# Patient Record
Sex: FEMALE | URBAN - METROPOLITAN AREA
[De-identification: names, ages, dates, MRNs, and addresses within clinical notes are randomized per-mention and may not be internally consistent; named-entity substitution may affect disease eponyms.]

---

## 2023-04-19 ENCOUNTER — APPOINTMENT (OUTPATIENT)
Dept: URBAN - METROPOLITAN AREA CLINIC 228 | Age: 78
Setting detail: DERMATOLOGY
End: 2023-04-19

## 2023-04-19 DIAGNOSIS — D49.2 NEOPLASM OF UNSPECIFIED BEHAVIOR OF BONE, SOFT TISSUE, AND SKIN: ICD-10-CM

## 2023-04-19 PROCEDURE — OTHER BIOPSY BY SHAVE METHOD: OTHER

## 2023-04-19 PROCEDURE — OTHER COUNSELING: OTHER

## 2023-04-19 PROCEDURE — OTHER MIPS QUALITY: OTHER

## 2023-04-19 PROCEDURE — 11102 TANGNTL BX SKIN SINGLE LES: CPT

## 2023-04-19 ASSESSMENT — LOCATION ZONE DERM: LOCATION ZONE: LEG

## 2023-04-19 ASSESSMENT — LOCATION SIMPLE DESCRIPTION DERM: LOCATION SIMPLE: LEFT PRETIBIAL REGION

## 2023-04-19 ASSESSMENT — LOCATION DETAILED DESCRIPTION DERM: LOCATION DETAILED: LEFT MEDIAL PROXIMAL PRETIBIAL REGION

## 2023-04-19 NOTE — PROCEDURE: MIPS QUALITY
Quality 111:Pneumonia Vaccination Status For Older Adults: Pneumococcal vaccine (PPSV23) administered on or after patient’s 60th birthday and before the end of the measurement period
Quality 130: Documentation Of Current Medications In The Medical Record: Current Medications Documented
Detail Level: Detailed
Quality 431: Preventive Care And Screening: Unhealthy Alcohol Use - Screening: Patient not identified as an unhealthy alcohol user when screened for unhealthy alcohol use using a systematic screening method
Quality 110: Preventive Care And Screening: Influenza Immunization: Influenza Immunization Administered during Influenza season
Quality 47: Advance Care Plan: Advance Care Planning discussed and documented; advance care plan or surrogate decision maker documented in the medical record.
Quality 226: Preventive Care And Screening: Tobacco Use: Screening And Cessation Intervention: Patient screened for tobacco use and is an ex/non-smoker

## 2023-04-27 ENCOUNTER — APPOINTMENT (OUTPATIENT)
Dept: URBAN - METROPOLITAN AREA CLINIC 228 | Age: 78
Setting detail: DERMATOLOGY
End: 2023-04-27

## 2023-04-27 DIAGNOSIS — S31000A OPEN WOUND(S) (MULTIPLE) OF UNSPECIFIED SITE(S), WITHOUT MENTION OF COMPLICATION: ICD-10-CM

## 2023-04-27 PROBLEM — S81.802A UNSPECIFIED OPEN WOUND, LEFT LOWER LEG, INITIAL ENCOUNTER: Status: ACTIVE | Noted: 2023-04-27

## 2023-04-27 PROCEDURE — OTHER MIPS QUALITY: OTHER

## 2023-04-27 PROCEDURE — OTHER COUNSELING: OTHER

## 2023-04-27 PROCEDURE — OTHER PATHOLOGY DISCUSSION: OTHER

## 2023-04-27 PROCEDURE — OTHER POST-OP WOUND CHECK: OTHER

## 2023-04-27 PROCEDURE — 99024 POSTOP FOLLOW-UP VISIT: CPT

## 2023-04-27 PROCEDURE — OTHER PRESCRIPTION: OTHER

## 2023-04-27 RX ORDER — MUPIROCIN 20 MG/G
OINTMENT TOPICAL
Qty: 22 | Refills: 0 | Status: ERX | COMMUNITY
Start: 2023-04-27

## 2023-04-27 ASSESSMENT — LOCATION ZONE DERM: LOCATION ZONE: LEG

## 2023-04-27 ASSESSMENT — LOCATION SIMPLE DESCRIPTION DERM: LOCATION SIMPLE: LEFT PRETIBIAL REGION

## 2023-04-27 ASSESSMENT — LOCATION DETAILED DESCRIPTION DERM: LOCATION DETAILED: LEFT PROXIMAL PRETIBIAL REGION

## 2023-04-27 NOTE — PROCEDURE: POST-OP WOUND CHECK
Detail Level: Detailed
Add 92350 Cpt? (Important Note: In 2017 The Use Of 58114 Is Being Tracked By Cms To Determine Future Global Period Reimbursement For Global Periods): yes
Wound Evaluated By: Demario Washington MD
Additional Comments: Mild pink erythema at the border

## 2023-05-01 ENCOUNTER — APPOINTMENT (OUTPATIENT)
Dept: URBAN - METROPOLITAN AREA CLINIC 228 | Age: 78
Setting detail: DERMATOLOGY
End: 2023-05-03

## 2023-05-01 DIAGNOSIS — Z85.828 PERSONAL HISTORY OF OTHER MALIGNANT NEOPLASM OF SKIN: ICD-10-CM

## 2023-05-01 PROBLEM — C44.729 SQUAMOUS CELL CARCINOMA OF SKIN OF LEFT LOWER LIMB, INCLUDING HIP: Status: ACTIVE | Noted: 2023-05-01

## 2023-05-01 PROCEDURE — OTHER MIPS QUALITY: OTHER

## 2023-05-01 PROCEDURE — 17313 MOHS 1 STAGE T/A/L: CPT

## 2023-05-01 PROCEDURE — 17314 MOHS ADDL STAGE T/A/L: CPT

## 2023-05-01 PROCEDURE — OTHER COUNSELING: OTHER

## 2023-05-01 PROCEDURE — OTHER PATHOLOGY DISCUSSION: OTHER

## 2023-05-01 PROCEDURE — OTHER MOHS SURGERY: OTHER

## 2023-05-01 NOTE — PROCEDURE: MIPS QUALITY
Quality 110: Preventive Care And Screening: Influenza Immunization: Influenza Immunization Administered during Influenza season
Quality 226: Preventive Care And Screening: Tobacco Use: Screening And Cessation Intervention: Patient screened for tobacco use and is an ex/non-smoker
Quality 111:Pneumonia Vaccination Status For Older Adults: Patient received any pneumococcal conjugate or polysaccharide vaccine on or after their 60th birthday and before the end of the measurement period
Quality 130: Documentation Of Current Medications In The Medical Record: Current Medications Documented
Detail Level: Detailed
Quality 431: Preventive Care And Screening: Unhealthy Alcohol Use - Screening: Patient not identified as an unhealthy alcohol user when screened for unhealthy alcohol use using a systematic screening method

## 2023-05-02 ENCOUNTER — APPOINTMENT (OUTPATIENT)
Dept: URBAN - METROPOLITAN AREA CLINIC 228 | Age: 78
Setting detail: DERMATOLOGY
End: 2023-05-02

## 2023-05-02 DIAGNOSIS — L82.1 OTHER SEBORRHEIC KERATOSIS: ICD-10-CM

## 2023-05-02 DIAGNOSIS — D49.2 NEOPLASM OF UNSPECIFIED BEHAVIOR OF BONE, SOFT TISSUE, AND SKIN: ICD-10-CM

## 2023-05-02 DIAGNOSIS — D18.0 HEMANGIOMA: ICD-10-CM

## 2023-05-02 DIAGNOSIS — Z48.817 ENCOUNTER FOR SURGICAL AFTERCARE FOLLOWING SURGERY ON THE SKIN AND SUBCUTANEOUS TISSUE: ICD-10-CM

## 2023-05-02 PROBLEM — D18.01 HEMANGIOMA OF SKIN AND SUBCUTANEOUS TISSUE: Status: ACTIVE | Noted: 2023-05-02

## 2023-05-02 PROCEDURE — OTHER ADDITIONAL NOTES: OTHER

## 2023-05-02 PROCEDURE — OTHER MIPS QUALITY: OTHER

## 2023-05-02 PROCEDURE — OTHER COUNSELING: OTHER

## 2023-05-02 PROCEDURE — 99213 OFFICE O/P EST LOW 20 MIN: CPT

## 2023-05-02 ASSESSMENT — LOCATION SIMPLE DESCRIPTION DERM
LOCATION SIMPLE: RIGHT FOOT
LOCATION SIMPLE: UPPER BACK
LOCATION SIMPLE: LEFT PRETIBIAL REGION
LOCATION SIMPLE: CHEST

## 2023-05-02 ASSESSMENT — LOCATION DETAILED DESCRIPTION DERM
LOCATION DETAILED: INFERIOR THORACIC SPINE
LOCATION DETAILED: LEFT PROXIMAL PRETIBIAL REGION
LOCATION DETAILED: LEFT MEDIAL SUPERIOR CHEST
LOCATION DETAILED: RIGHT DORSAL FOOT

## 2023-05-02 ASSESSMENT — LOCATION ZONE DERM
LOCATION ZONE: LEG
LOCATION ZONE: FEET
LOCATION ZONE: TRUNK

## 2023-05-02 NOTE — PROCEDURE: COUNSELING
Detail Level: Simple
Patient Specific Counseling (Will Not Stick From Patient To Patient): The lesion could be inflammatory given its short duration.  It could also be a skin cancer that stress becoming apparent.  I suggested she contact her [place] dermatologist and have it examined and possibly biopsied if it still present.  She will not want to have a biopsy wound to deal with while she is traveling home.
Detail Level: Zone

## 2023-05-02 NOTE — PROCEDURE: ADDITIONAL NOTES
Additional Notes: Patient leaving in 3 days back home to [place] for the summer advised to have the lesion biopsied there .

## 2023-05-15 LAB — CARCINOEMBRYONIC AG (NG/ML) IN SER/PLAS: 2.5 UG/L

## 2023-05-16 NOTE — PROCEDURE: MOHS SURGERY
No Xenograft Text: The defect edges were debeveled with a #15 scalpel blade. Given the location of the defect, shape of the defect and the proximity to free margins a xenograft was deemed most appropriate.  The graft was then trimmed to fit the size of the defect.  The graft was then placed in the primary defect and oriented appropriately.

## 2023-06-05 ENCOUNTER — OFFICE VISIT (OUTPATIENT)
Dept: PRIMARY CARE | Facility: CLINIC | Age: 78
End: 2023-06-05
Payer: MEDICARE

## 2023-06-05 VITALS
RESPIRATION RATE: 18 BRPM | SYSTOLIC BLOOD PRESSURE: 122 MMHG | BODY MASS INDEX: 26.57 KG/M2 | DIASTOLIC BLOOD PRESSURE: 66 MMHG | TEMPERATURE: 97 F | OXYGEN SATURATION: 92 % | HEART RATE: 87 BPM | WEIGHT: 164.6 LBS

## 2023-06-05 DIAGNOSIS — G62.89 OTHER POLYNEUROPATHY: ICD-10-CM

## 2023-06-05 DIAGNOSIS — F33.41 RECURRENT MAJOR DEPRESSIVE DISORDER, IN PARTIAL REMISSION (CMS-HCC): ICD-10-CM

## 2023-06-05 DIAGNOSIS — E78.5 HYPERLIPIDEMIA, UNSPECIFIED HYPERLIPIDEMIA TYPE: ICD-10-CM

## 2023-06-05 DIAGNOSIS — Z13.6 SCREENING FOR CARDIOVASCULAR CONDITION: ICD-10-CM

## 2023-06-05 DIAGNOSIS — I82.491 DEEP VEIN THROMBOSIS (DVT) OF OTHER VEIN OF RIGHT LOWER EXTREMITY, UNSPECIFIED CHRONICITY (MULTI): ICD-10-CM

## 2023-06-05 DIAGNOSIS — Z00.00 ANNUAL PHYSICAL EXAM: ICD-10-CM

## 2023-06-05 DIAGNOSIS — C18.1 CANCER OF APPENDIX (MULTI): ICD-10-CM

## 2023-06-05 DIAGNOSIS — Z86.59 HISTORY OF DEPRESSION: ICD-10-CM

## 2023-06-05 DIAGNOSIS — Z13.39 ALCOHOL SCREENING: ICD-10-CM

## 2023-06-05 DIAGNOSIS — Z00.00 MEDICARE ANNUAL WELLNESS VISIT, SUBSEQUENT: Primary | ICD-10-CM

## 2023-06-05 DIAGNOSIS — Z71.89 ADVANCE DIRECTIVE DISCUSSED WITH PATIENT: ICD-10-CM

## 2023-06-05 DIAGNOSIS — Z13.31 DEPRESSION SCREEN: ICD-10-CM

## 2023-06-05 PROBLEM — R10.32 LEFT GROIN PAIN: Status: ACTIVE | Noted: 2023-06-05

## 2023-06-05 PROBLEM — I82.401 RIGHT LEG DVT (MULTI): Status: ACTIVE | Noted: 2023-06-05

## 2023-06-05 PROBLEM — D37.3 LOW GRADE MUCINOUS NEOPLASM OF APPENDIX: Status: ACTIVE | Noted: 2023-06-05

## 2023-06-05 PROBLEM — M54.9 BACK PAIN: Status: ACTIVE | Noted: 2023-06-05

## 2023-06-05 PROBLEM — N60.12 FIBROCYSTIC CHANGES OF LEFT BREAST: Status: ACTIVE | Noted: 2023-06-05

## 2023-06-05 PROBLEM — K21.9 CHRONIC GERD: Status: ACTIVE | Noted: 2023-06-05

## 2023-06-05 PROBLEM — R10.9 LEFT FLANK PAIN: Status: ACTIVE | Noted: 2023-06-05

## 2023-06-05 PROBLEM — N39.0 ACUTE UTI: Status: ACTIVE | Noted: 2023-06-05

## 2023-06-05 PROBLEM — R92.8 ABNORMAL MAMMOGRAM: Status: ACTIVE | Noted: 2023-06-05

## 2023-06-05 PROBLEM — R73.9 ELEVATED BLOOD SUGAR LEVEL: Status: ACTIVE | Noted: 2023-06-05

## 2023-06-05 PROBLEM — N64.4 BREAST PAIN: Status: ACTIVE | Noted: 2023-06-05

## 2023-06-05 PROBLEM — F33.9 RECURRENT MAJOR DEPRESSIVE DISORDER (CMS-HCC): Status: ACTIVE | Noted: 2023-06-05

## 2023-06-05 PROBLEM — D24.2 INTRADUCTAL PAPILLOMA OF LEFT BREAST: Status: ACTIVE | Noted: 2023-06-05

## 2023-06-05 PROBLEM — Z95.828 PRESENCE OF IVC FILTER: Status: ACTIVE | Noted: 2023-06-05

## 2023-06-05 PROBLEM — M72.2 PLANTAR FASCIITIS, LEFT: Status: ACTIVE | Noted: 2023-06-05

## 2023-06-05 PROBLEM — N60.11 FIBROCYSTIC CHANGES OF RIGHT BREAST: Status: ACTIVE | Noted: 2023-06-05

## 2023-06-05 PROBLEM — R35.0 FREQUENCY OF MICTURITION: Status: ACTIVE | Noted: 2023-06-05

## 2023-06-05 PROBLEM — M54.50 LOW BACK PAIN, EPISODIC: Status: ACTIVE | Noted: 2023-06-05

## 2023-06-05 PROBLEM — R10.32 ACUTE LEFT LOWER QUADRANT PAIN: Status: ACTIVE | Noted: 2023-06-05

## 2023-06-05 PROBLEM — M25.471 RIGHT ANKLE SWELLING: Status: ACTIVE | Noted: 2023-06-05

## 2023-06-05 PROBLEM — I49.3 PVC (PREMATURE VENTRICULAR CONTRACTION): Status: ACTIVE | Noted: 2023-06-05

## 2023-06-05 PROBLEM — M79.673 HEEL PAIN: Status: ACTIVE | Noted: 2023-06-05

## 2023-06-05 PROBLEM — R07.89 ATYPICAL CHEST PAIN: Status: ACTIVE | Noted: 2023-06-05

## 2023-06-05 PROBLEM — M79.2 NEURALGIA: Status: ACTIVE | Noted: 2023-06-05

## 2023-06-05 PROBLEM — J20.9 ACUTE BRONCHITIS: Status: ACTIVE | Noted: 2023-06-05

## 2023-06-05 PROBLEM — R59.0 AXILLARY ADENOPATHY: Status: ACTIVE | Noted: 2023-06-05

## 2023-06-05 PROBLEM — R92.8 ABNORMAL ULTRASOUND OF BREAST: Status: ACTIVE | Noted: 2023-06-05

## 2023-06-05 PROBLEM — G62.9 PERIPHERAL NEUROPATHY: Status: ACTIVE | Noted: 2023-06-05

## 2023-06-05 PROBLEM — R53.83 TIREDNESS: Status: ACTIVE | Noted: 2023-06-05

## 2023-06-05 PROCEDURE — 1160F RVW MEDS BY RX/DR IN RCRD: CPT | Performed by: INTERNAL MEDICINE

## 2023-06-05 PROCEDURE — 1170F FXNL STATUS ASSESSED: CPT | Performed by: INTERNAL MEDICINE

## 2023-06-05 PROCEDURE — 99213 OFFICE O/P EST LOW 20 MIN: CPT | Performed by: INTERNAL MEDICINE

## 2023-06-05 PROCEDURE — G0446 INTENS BEHAVE THER CARDIO DX: HCPCS | Performed by: INTERNAL MEDICINE

## 2023-06-05 PROCEDURE — 1036F TOBACCO NON-USER: CPT | Performed by: INTERNAL MEDICINE

## 2023-06-05 PROCEDURE — 99497 ADVNCD CARE PLAN 30 MIN: CPT | Performed by: INTERNAL MEDICINE

## 2023-06-05 PROCEDURE — G0439 PPPS, SUBSEQ VISIT: HCPCS | Performed by: INTERNAL MEDICINE

## 2023-06-05 PROCEDURE — 1159F MED LIST DOCD IN RCRD: CPT | Performed by: INTERNAL MEDICINE

## 2023-06-05 PROCEDURE — G0444 DEPRESSION SCREEN ANNUAL: HCPCS | Performed by: INTERNAL MEDICINE

## 2023-06-05 PROCEDURE — G0442 ANNUAL ALCOHOL SCREEN 15 MIN: HCPCS | Performed by: INTERNAL MEDICINE

## 2023-06-05 PROCEDURE — 99397 PER PM REEVAL EST PAT 65+ YR: CPT | Performed by: INTERNAL MEDICINE

## 2023-06-05 RX ORDER — CITALOPRAM 10 MG/1
1 TABLET ORAL DAILY
COMMUNITY
End: 2023-06-05

## 2023-06-05 RX ORDER — SOLIFENACIN SUCCINATE 10 MG/1
TABLET, FILM COATED ORAL
COMMUNITY
Start: 2023-06-01 | End: 2023-06-05

## 2023-06-05 RX ORDER — CEPHALEXIN 500 MG/1
500 CAPSULE ORAL EVERY 8 HOURS
COMMUNITY
Start: 2023-01-07 | End: 2023-06-05

## 2023-06-05 RX ORDER — ASPIRIN 81 MG/1
81 TABLET ORAL DAILY
COMMUNITY

## 2023-06-05 RX ORDER — GABAPENTIN 100 MG/1
100 CAPSULE ORAL NIGHTLY
COMMUNITY
Start: 2023-04-10 | End: 2023-06-05

## 2023-06-05 RX ORDER — ESTRADIOL 0.1 MG/G
CREAM VAGINAL
COMMUNITY
Start: 2023-05-25

## 2023-06-05 RX ORDER — TRAZODONE HYDROCHLORIDE 50 MG/1
25 TABLET ORAL NIGHTLY
COMMUNITY
Start: 2019-04-14 | End: 2023-12-05 | Stop reason: SDUPTHER

## 2023-06-05 RX ORDER — METHYLPREDNISOLONE 4 MG/1
TABLET ORAL
COMMUNITY
Start: 2023-01-11 | End: 2023-06-05

## 2023-06-05 RX ORDER — CALCIUM CARBONATE 200(500)MG
TABLET,CHEWABLE ORAL
COMMUNITY
Start: 2014-09-29

## 2023-06-05 RX ORDER — MELOXICAM 15 MG/1
TABLET ORAL
COMMUNITY
Start: 2021-01-26 | End: 2023-06-05

## 2023-06-05 RX ORDER — CEPHRADINE 500 MG
CAPSULE ORAL
COMMUNITY

## 2023-06-05 RX ORDER — METHOCARBAMOL 750 MG/1
TABLET, FILM COATED ORAL
COMMUNITY
Start: 2023-01-11 | End: 2023-06-05

## 2023-06-05 RX ORDER — LANOLIN ALCOHOL/MO/W.PET/CERES
1 CREAM (GRAM) TOPICAL DAILY
COMMUNITY
Start: 2014-09-29

## 2023-06-05 RX ORDER — OMEPRAZOLE 20 MG/1
CAPSULE, DELAYED RELEASE ORAL
COMMUNITY
Start: 2014-07-12 | End: 2023-10-30 | Stop reason: SDUPTHER

## 2023-06-05 RX ORDER — MUPIROCIN 20 MG/G
OINTMENT TOPICAL
COMMUNITY
Start: 2023-04-27 | End: 2023-06-05

## 2023-06-05 ASSESSMENT — ACTIVITIES OF DAILY LIVING (ADL)
GROCERY_SHOPPING: INDEPENDENT
MANAGING_FINANCES: INDEPENDENT
TAKING_MEDICATION: INDEPENDENT
DRESSING: INDEPENDENT
DOING_HOUSEWORK: INDEPENDENT
BATHING: INDEPENDENT

## 2023-06-05 ASSESSMENT — PATIENT HEALTH QUESTIONNAIRE - PHQ9
1. LITTLE INTEREST OR PLEASURE IN DOING THINGS: NOT AT ALL
SUM OF ALL RESPONSES TO PHQ9 QUESTIONS 1 AND 2: 0
2. FEELING DOWN, DEPRESSED OR HOPELESS: NOT AT ALL

## 2023-06-05 NOTE — PROGRESS NOTES
My nurse note reviewed. Patient is here for:  Medicare Annual Wellness Visit Subsequent (Sensitive feet , tissue removed from left leg and having trouble going to restroom at night cant hold urin to makeit to restroom)     Here with      Patient denies any shortness of breath, PND, orthopnea, chest pain , palpitation, syncope or edema in legs  patient denies any abdominal pain, tenderness, nausea, vomiting, change in bowel habits or blood in stool.  Patient denies any weakness in extremities.. Denies any headache, visual symptoms , speech problems or  tremors . No TIA or stroke like symptoms..  Neuropathy symptoms are worsening, has to shake feet at times.   No pain .  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     During Medicare wellness apart from looking at assessment done by nurse,  I also asked following :    Alcohol use : Alcohol screening  was  done during this visit. Patient is not having any issue with increase alcohol use . No ETOH abuse was observed by history . Drinks only once or twice a yr    HIV test: patient was not found to be high risk for HIV     Cognitive issue : None     Advanced Directive: Patient has advanced directive including living will and Health care power of . Health POA is  . All questions related to it answered. Total time > 16 min.     I have reviewed all active medications patient is currently on . Questions related to medication answered to patient's satisfaction.    REVIEW OF SYSTEMS:   All other systems have been reviewed and are negative in relation to patient's complaint and other than what is mentioned in History of present illness.    OBJECTIVE :  Vitals noted   Not in acute distress  Conj Pink, No icterus  ears exam normal  Neck:No Cervical LN enlargement, No Thyroid enlargement No carotid bruit  Lungs: good air entry bilaterally, no rales or rhonchi  CVS: S1 S2 + , no S3. No loud heart murmur heard.   Abdomen: Soft, non tender , BS  +. no organomegaly , no CVA tenderness  MSK: No spine tenderness or muscle tenderness noted on gross examination  CNS: Pt is alert, moving all 4 extremities. no motor weakness or abnormal movements noted on gross examination.  Extremities: No edema, No calf tenderness, Kenji's sign negative. DTR + knee and wrists, Rhomberg's neg    Assessment:    1. Medicare annual wellness visit, subsequent - done   2. Annual physical exam - done . Tests ordered   3. Alcohol screening    4. Depression screen    5. Advance directive discussed with patient    6. Screening for cardiovascular condition    7. Other polyneuropathy    8. History of depression    9. Hyperlipidemia, unspecified hyperlipidemia type - blood tests       During Medicare wellness patients chronic diagnosis were reviewed and questions related to it answered to patient's satisfaction . Risk factors for heart, stroke were discussed with patient . Discussion about preventative tests were done with patient also . pain issue was discussed as appropriate for patient .  ASCVD score was 23.1  %     Plan:   Medicare wellness done  Physical exam done -tests ordered  Blood tests ordered  She will check with her obgyn for overactive bladder symptoms  F/U with surgeon for breast cancer follow up   F/U with me in December or as needed

## 2023-06-05 NOTE — PATIENT INSTRUCTIONS
Plan:   Medicare wellness done  Physical exam done -tests ordered  Blood tests ordered  She will check with her obgyn for overactive bladder symptoms  F/U with surgeon for breast cancer follow up   F/U with me in December or as needed

## 2023-06-08 ENCOUNTER — LAB (OUTPATIENT)
Dept: LAB | Facility: LAB | Age: 78
End: 2023-06-08
Payer: MEDICARE

## 2023-06-08 DIAGNOSIS — G62.89 OTHER POLYNEUROPATHY: ICD-10-CM

## 2023-06-08 DIAGNOSIS — E78.5 HYPERLIPIDEMIA, UNSPECIFIED HYPERLIPIDEMIA TYPE: ICD-10-CM

## 2023-06-08 LAB
ANION GAP IN SER/PLAS: 12 MMOL/L (ref 10–20)
BASOPHILS (10*3/UL) IN BLOOD BY AUTOMATED COUNT: 0.03 X10E9/L (ref 0–0.1)
BASOPHILS/100 LEUKOCYTES IN BLOOD BY AUTOMATED COUNT: 0.4 % (ref 0–2)
CALCIUM (MG/DL) IN SER/PLAS: 8.8 MG/DL (ref 8.6–10.3)
CARBON DIOXIDE, TOTAL (MMOL/L) IN SER/PLAS: 23 MMOL/L (ref 21–32)
CHLORIDE (MMOL/L) IN SER/PLAS: 108 MMOL/L (ref 98–107)
CHOLESTEROL (MG/DL) IN SER/PLAS: 173 MG/DL (ref 0–199)
CHOLESTEROL IN HDL (MG/DL) IN SER/PLAS: 60 MG/DL
CHOLESTEROL/HDL RATIO: 2.9
COBALAMIN (VITAMIN B12) (PG/ML) IN SER/PLAS: 620 PG/ML (ref 211–911)
CREATININE (MG/DL) IN SER/PLAS: 0.51 MG/DL (ref 0.5–1.05)
EOSINOPHILS (10*3/UL) IN BLOOD BY AUTOMATED COUNT: 0.19 X10E9/L (ref 0–0.4)
EOSINOPHILS/100 LEUKOCYTES IN BLOOD BY AUTOMATED COUNT: 2.4 % (ref 0–6)
ERYTHROCYTE DISTRIBUTION WIDTH (RATIO) BY AUTOMATED COUNT: 12.5 % (ref 11.5–14.5)
ERYTHROCYTE MEAN CORPUSCULAR HEMOGLOBIN CONCENTRATION (G/DL) BY AUTOMATED: 33.6 G/DL (ref 32–36)
ERYTHROCYTE MEAN CORPUSCULAR VOLUME (FL) BY AUTOMATED COUNT: 93 FL (ref 80–100)
ERYTHROCYTES (10*6/UL) IN BLOOD BY AUTOMATED COUNT: 4.75 X10E12/L (ref 4–5.2)
GFR FEMALE: >90 ML/MIN/1.73M2
GLUCOSE (MG/DL) IN SER/PLAS: 104 MG/DL (ref 74–99)
HEMATOCRIT (%) IN BLOOD BY AUTOMATED COUNT: 44.1 % (ref 36–46)
HEMOGLOBIN (G/DL) IN BLOOD: 14.8 G/DL (ref 12–16)
IMMATURE GRANULOCYTES/100 LEUKOCYTES IN BLOOD BY AUTOMATED COUNT: 0.3 % (ref 0–0.9)
LEUKOCYTES (10*3/UL) IN BLOOD BY AUTOMATED COUNT: 8 X10E9/L (ref 4.4–11.3)
LYMPHOCYTES (10*3/UL) IN BLOOD BY AUTOMATED COUNT: 1.55 X10E9/L (ref 0.8–3)
LYMPHOCYTES/100 LEUKOCYTES IN BLOOD BY AUTOMATED COUNT: 19.4 % (ref 13–44)
MONOCYTES (10*3/UL) IN BLOOD BY AUTOMATED COUNT: 0.76 X10E9/L (ref 0.05–0.8)
MONOCYTES/100 LEUKOCYTES IN BLOOD BY AUTOMATED COUNT: 9.5 % (ref 2–10)
NEUTROPHILS (10*3/UL) IN BLOOD BY AUTOMATED COUNT: 5.45 X10E9/L (ref 1.6–5.5)
NEUTROPHILS/100 LEUKOCYTES IN BLOOD BY AUTOMATED COUNT: 68 % (ref 40–80)
NON-HDL CHOLESTEROL: 113 MG/DL
NRBC (PER 100 WBCS) BY AUTOMATED COUNT: 0 /100 WBC (ref 0–0)
PLATELETS (10*3/UL) IN BLOOD AUTOMATED COUNT: 222 X10E9/L (ref 150–450)
POTASSIUM (MMOL/L) IN SER/PLAS: 4.2 MMOL/L (ref 3.5–5.3)
SODIUM (MMOL/L) IN SER/PLAS: 139 MMOL/L (ref 136–145)
THYROTROPIN (MIU/L) IN SER/PLAS BY DETECTION LIMIT <= 0.05 MIU/L: 1.97 MIU/L (ref 0.44–3.98)
UREA NITROGEN (MG/DL) IN SER/PLAS: 19 MG/DL (ref 6–23)

## 2023-06-08 PROCEDURE — 80048 BASIC METABOLIC PNL TOTAL CA: CPT

## 2023-06-08 PROCEDURE — 84443 ASSAY THYROID STIM HORMONE: CPT

## 2023-06-08 PROCEDURE — 85025 COMPLETE CBC W/AUTO DIFF WBC: CPT

## 2023-06-08 PROCEDURE — 82465 ASSAY BLD/SERUM CHOLESTEROL: CPT

## 2023-06-08 PROCEDURE — 82607 VITAMIN B-12: CPT

## 2023-06-08 PROCEDURE — 36415 COLL VENOUS BLD VENIPUNCTURE: CPT

## 2023-06-08 PROCEDURE — 83718 ASSAY OF LIPOPROTEIN: CPT

## 2023-06-16 ENCOUNTER — TELEPHONE (OUTPATIENT)
Dept: PRIMARY CARE | Facility: CLINIC | Age: 78
End: 2023-06-16
Payer: MEDICARE

## 2023-06-16 NOTE — TELEPHONE ENCOUNTER
----- Message from Analia Lee sent at 6/16/2023  9:48 AM EDT -----    ----- Message -----  From: Corrie John MA  Sent: 6/15/2023   3:37 PM EDT  To: Analia Lee    [facility] to notify patient of normal results.  ----- Message -----  From: Analia Lee  Sent: 6/15/2023   8:53 AM EDT  To: Corrie John MA      ----- Message -----  From: Gage Angela MD  Sent: 6/15/2023   8:03 AM EDT  To: Analia Lee    I have reviewed your recent blood tests ordered by me and there were no significant abnormality noted.   Please notify patient. Thanks.

## 2023-06-28 ENCOUNTER — OFFICE VISIT (OUTPATIENT)
Dept: PRIMARY CARE | Facility: CLINIC | Age: 78
End: 2023-06-28
Payer: MEDICARE

## 2023-06-28 VITALS
WEIGHT: 158 LBS | DIASTOLIC BLOOD PRESSURE: 70 MMHG | HEART RATE: 96 BPM | TEMPERATURE: 98 F | BODY MASS INDEX: 25.5 KG/M2 | SYSTOLIC BLOOD PRESSURE: 118 MMHG

## 2023-06-28 DIAGNOSIS — Z86.718 HX OF DEEP VENOUS THROMBOSIS: ICD-10-CM

## 2023-06-28 DIAGNOSIS — M54.50 ACUTE LOW BACK PAIN WITHOUT SCIATICA, UNSPECIFIED BACK PAIN LATERALITY: Primary | ICD-10-CM

## 2023-06-28 PROCEDURE — 1036F TOBACCO NON-USER: CPT | Performed by: INTERNAL MEDICINE

## 2023-06-28 PROCEDURE — 1159F MED LIST DOCD IN RCRD: CPT | Performed by: INTERNAL MEDICINE

## 2023-06-28 PROCEDURE — 1160F RVW MEDS BY RX/DR IN RCRD: CPT | Performed by: INTERNAL MEDICINE

## 2023-06-28 PROCEDURE — 99214 OFFICE O/P EST MOD 30 MIN: CPT | Performed by: INTERNAL MEDICINE

## 2023-06-28 RX ORDER — METHYLPREDNISOLONE 4 MG/1
TABLET ORAL
Qty: 21 TABLET | Refills: 0 | Status: SHIPPED | OUTPATIENT
Start: 2023-06-28 | End: 2023-07-05

## 2023-06-28 RX ORDER — METHOCARBAMOL 500 MG/1
500 TABLET, FILM COATED ORAL 4 TIMES DAILY PRN
Qty: 40 TABLET | Refills: 0 | Status: SHIPPED | OUTPATIENT
Start: 2023-06-28 | End: 2023-11-28

## 2023-06-28 NOTE — PATIENT INSTRUCTIONS
Plan   Going on Cruise in near future   Prescription of steroid and muscle relaxer given in case she needs while on cruise   Stretching exercise discussed  F/U as advised or as needed

## 2023-06-28 NOTE — PROGRESS NOTES
Pt is here for ER follow up    Pt was in ER for low back pain , started after lifting stuff at [employer] . She thinks she twisted wrong way. No radiation of pain .   She has hx of DVT . US showed old DVT but no new DVT on R side   Available ER  discharge summary , pertinent lab and radiological results were reviewed and discussed with patient . Questions related to it answered to patient's satisfaction.    She was given medrol dose shira and robaxin which she is taking it with good results still has 2 days left.   She is going on cruise so needs this rx in case she needs while on cruise.   patient denies any dysuria, frequency of urine or hematuria .  OBJECTIVE :  /70   Pulse 96   Temp 36.7 °C (98 °F) (Temporal)   Wt 71.7 kg (158 lb)   BMI 25.50 kg/m²   CVS: S1 S2 + , no S3. No loud heart murmur appreciated. Lungs clear, No edema  Abdomen: soft, non tender. No organomegaly noted. BS +. No CVA tenderness noted.  Some tenderness in both paralumbar area. No skin lesions.        1. Acute low back pain without sciatica, unspecified back pain laterality  improving      2. Hx of deep venous thrombosis  No new DVT by recent US         Plan   Going on Cruise in near future   Prescription of steroid and muscle relaxer given in case she needs while on cruise   Stretching exercise discussed  F/U as advised or as needed

## 2023-09-06 LAB
CARCINOEMBRYONIC AG (NG/ML) IN SER/PLAS: 2.7 UG/L
CREATININE (MG/DL) IN SER/PLAS: 0.62 MG/DL (ref 0.5–1.05)
GFR FEMALE: 90 ML/MIN/1.73M2

## 2023-10-10 ENCOUNTER — OFFICE VISIT (OUTPATIENT)
Dept: PRIMARY CARE | Facility: CLINIC | Age: 78
End: 2023-10-10
Payer: MEDICARE

## 2023-10-10 VITALS
OXYGEN SATURATION: 93 % | WEIGHT: 160.2 LBS | HEART RATE: 79 BPM | BODY MASS INDEX: 25.86 KG/M2 | SYSTOLIC BLOOD PRESSURE: 133 MMHG | DIASTOLIC BLOOD PRESSURE: 79 MMHG | TEMPERATURE: 97.3 F

## 2023-10-10 DIAGNOSIS — F33.9 EPISODE OF RECURRENT MAJOR DEPRESSIVE DISORDER, UNSPECIFIED DEPRESSION EPISODE SEVERITY (CMS-HCC): Primary | ICD-10-CM

## 2023-10-10 DIAGNOSIS — G62.89 OTHER POLYNEUROPATHY: ICD-10-CM

## 2023-10-10 DIAGNOSIS — M79.2 NEURALGIA: ICD-10-CM

## 2023-10-10 PROCEDURE — 1036F TOBACCO NON-USER: CPT | Performed by: INTERNAL MEDICINE

## 2023-10-10 PROCEDURE — 1126F AMNT PAIN NOTED NONE PRSNT: CPT | Performed by: INTERNAL MEDICINE

## 2023-10-10 PROCEDURE — 1160F RVW MEDS BY RX/DR IN RCRD: CPT | Performed by: INTERNAL MEDICINE

## 2023-10-10 PROCEDURE — 1159F MED LIST DOCD IN RCRD: CPT | Performed by: INTERNAL MEDICINE

## 2023-10-10 PROCEDURE — 99214 OFFICE O/P EST MOD 30 MIN: CPT | Performed by: INTERNAL MEDICINE

## 2023-10-10 RX ORDER — MIRABEGRON 25 MG/1
25 TABLET, FILM COATED, EXTENDED RELEASE ORAL DAILY
COMMUNITY

## 2023-10-10 RX ORDER — SERTRALINE HYDROCHLORIDE 25 MG/1
25 TABLET, FILM COATED ORAL DAILY
Qty: 30 TABLET | Refills: 5 | Status: SHIPPED
Start: 2023-10-10 | End: 2023-11-02 | Stop reason: SINTOL

## 2023-10-10 ASSESSMENT — PATIENT HEALTH QUESTIONNAIRE - PHQ9
4. FEELING TIRED OR HAVING LITTLE ENERGY: NEARLY EVERY DAY
10. IF YOU CHECKED OFF ANY PROBLEMS, HOW DIFFICULT HAVE THESE PROBLEMS MADE IT FOR YOU TO DO YOUR WORK, TAKE CARE OF THINGS AT HOME, OR GET ALONG WITH OTHER PEOPLE: SOMEWHAT DIFFICULT
8. MOVING OR SPEAKING SO SLOWLY THAT OTHER PEOPLE COULD HAVE NOTICED. OR THE OPPOSITE, BEING SO FIGETY OR RESTLESS THAT YOU HAVE BEEN MOVING AROUND A LOT MORE THAN USUAL: NEARLY EVERY DAY
7. TROUBLE CONCENTRATING ON THINGS, SUCH AS READING THE NEWSPAPER OR WATCHING TELEVISION: NEARLY EVERY DAY
3. TROUBLE FALLING OR STAYING ASLEEP OR SLEEPING TOO MUCH: NEARLY EVERY DAY
1. LITTLE INTEREST OR PLEASURE IN DOING THINGS: NEARLY EVERY DAY
SUM OF ALL RESPONSES TO PHQ9 QUESTIONS 1 AND 2: 6
SUM OF ALL RESPONSES TO PHQ QUESTIONS 1-9: 21
9. THOUGHTS THAT YOU WOULD BE BETTER OFF DEAD, OR OF HURTING YOURSELF: NOT AT ALL
2. FEELING DOWN, DEPRESSED OR HOPELESS: NEARLY EVERY DAY
5. POOR APPETITE OR OVEREATING: NOT AT ALL
6. FEELING BAD ABOUT YOURSELF - OR THAT YOU ARE A FAILURE OR HAVE LET YOURSELF OR YOUR FAMILY DOWN: NEARLY EVERY DAY

## 2023-10-10 NOTE — PATIENT INSTRUCTIONS
Plan  Started on new med zoloft for depression.   Ref to Neurologist done for persistant neuropathy symptoms and having side effects with many meds in past.   F/U with me as advised

## 2023-10-10 NOTE — PROGRESS NOTES
My nurse note reviewed. Patient is here for:  Depression (Depression concerns and follow up regarding neuropathy)     Here with .   Hx of depression, did not tolerate many meds in past including nortriptyline, citalopram and lexapro.   Feels down during this time of year. Gets better when she goes to [place] around Jan in better weather.   Patient denies any shortness of breath, PND, orthopnea, chest pain , palpitation, syncope or edema in legs  Hx of agustina neuropathy , not improving with current meds. Would like to see specialist    OBJECTIVE :  /79 (BP Location: Left arm, Patient Position: Sitting, BP Cuff Size: Adult)   Pulse 79   Temp 36.3 °C (97.3 °F) (Temporal)   Wt 72.7 kg (160 lb 3.2 oz)   SpO2 93%   BMI 25.86 kg/m²   Seems somewhat down.   CVS: S1 S2 + , no S3. No loud heart murmur appreciated. Lungs clear, No edema  Alert, oriented, no edema .     Assessment:  1. Episode of recurrent major depressive disorder, unspecified depression episode severity (CMS/HCC)    2. Neuralgia    3. Other polyneuropathy      Plan  Started on new med zoloft for depression.   Ref to Neurologist done for persistent neuropathy symptoms and having side effects with many meds in past.   F/U with me as advised

## 2023-10-12 ENCOUNTER — TELEPHONE (OUTPATIENT)
Dept: PRIMARY CARE | Facility: CLINIC | Age: 78
End: 2023-10-12
Payer: MEDICARE

## 2023-10-12 NOTE — TELEPHONE ENCOUNTER
Patient left a voicemail requesting a the name of a psychiatrist for her to see to help with depression. Please advise.

## 2023-10-16 DIAGNOSIS — F33.41 RECURRENT MAJOR DEPRESSIVE DISORDER, IN PARTIAL REMISSION (CMS-HCC): Primary | ICD-10-CM

## 2023-10-30 DIAGNOSIS — K21.9 CHRONIC GERD: Primary | ICD-10-CM

## 2023-10-31 RX ORDER — OMEPRAZOLE 20 MG/1
20 CAPSULE, DELAYED RELEASE ORAL
Qty: 90 CAPSULE | Refills: 3 | Status: SHIPPED | OUTPATIENT
Start: 2023-10-31 | End: 2023-12-05 | Stop reason: SDUPTHER

## 2023-11-02 ENCOUNTER — OFFICE VISIT (OUTPATIENT)
Dept: BEHAVIORAL HEALTH | Facility: CLINIC | Age: 78
End: 2023-11-02
Payer: MEDICARE

## 2023-11-02 VITALS
BODY MASS INDEX: 25.55 KG/M2 | HEART RATE: 95 BPM | SYSTOLIC BLOOD PRESSURE: 176 MMHG | WEIGHT: 159 LBS | HEIGHT: 66 IN | DIASTOLIC BLOOD PRESSURE: 84 MMHG

## 2023-11-02 DIAGNOSIS — F41.1 GAD (GENERALIZED ANXIETY DISORDER): ICD-10-CM

## 2023-11-02 DIAGNOSIS — F33.41 RECURRENT MAJOR DEPRESSIVE DISORDER, IN PARTIAL REMISSION (CMS-HCC): ICD-10-CM

## 2023-11-02 PROCEDURE — 1159F MED LIST DOCD IN RCRD: CPT | Performed by: PSYCHIATRY & NEUROLOGY

## 2023-11-02 PROCEDURE — 1126F AMNT PAIN NOTED NONE PRSNT: CPT | Performed by: PSYCHIATRY & NEUROLOGY

## 2023-11-02 PROCEDURE — 99204 OFFICE O/P NEW MOD 45 MIN: CPT | Performed by: PSYCHIATRY & NEUROLOGY

## 2023-11-02 PROCEDURE — 1160F RVW MEDS BY RX/DR IN RCRD: CPT | Performed by: PSYCHIATRY & NEUROLOGY

## 2023-11-02 PROCEDURE — 1036F TOBACCO NON-USER: CPT | Performed by: PSYCHIATRY & NEUROLOGY

## 2023-11-02 ASSESSMENT — ENCOUNTER SYMPTOMS
DEPRESSED MOOD: 1
CONFUSION: 0
SHORTNESS OF BREATH: 0
SLEEP DISTURBANCE: 0
NAUSEA: 0
DEPRESSION: 1
NERVOUS/ANXIOUS: 1
TREMORS: 0
NUMBNESS: 1
HYPERACTIVE: 0
DECREASED CONCENTRATION: 1
INSOMNIA: 1
PALPITATIONS: 0
HALLUCINATIONS: 0
RESTLESSNESS: 1
DIFFICULTY URINATING: 0
ANHEDONIA: 0
MUSCLE TENSION: 1
SEIZURES: 0
AGITATION: 0
DYSPHORIC MOOD: 0

## 2023-11-02 NOTE — PROGRESS NOTES
Subjective   Patient ID: Trinh Vences is a 77 y.o. female who presents for anxiety and depression   Ongoing anxiety and depression   Takes Trazodone 1/2 tab once daily in the evening, no much benefit for anxiety but helps with sleep   Guilty about the death of her father (  in Hospice 18 years ago )  Enjoys hiking club and socializing with others, reading, listening to music     Anxiety  Presents for initial visit. Onset was more than 5 years ago. The problem has been waxing and waning. Symptoms include decreased concentration, depressed mood, excessive worry, insomnia, muscle tension, nervous/anxious behavior and restlessness. Patient reports no confusion, nausea, palpitations, shortness of breath or suicidal ideas. The severity of symptoms is interfering with daily activities. The symptoms are aggravated by family issues. The quality of sleep is poor. Nighttime awakenings: several.     Risk factors include family history. Past treatments include non-SSRI antidepressants.   Depression  Visit Type: initial  Onset of symptoms: more than 5 years ago  Progression since onset: waxing and waning  Patient presents with the following symptoms: decreased concentration, depressed mood, excessive worry, insomnia, muscle tension, nervousness/anxiety and restlessness.  Patient is not experiencing: anhedonia, confusion, palpitations, shortness of breath and suicidal ideas.          Past Psychiatric History:  Attended psychotherapy after college for anxiety. Last time attended therapy 10 years ago.   Nortriptyline   Paxil  Other antidepressants she could not remember, takes them for few days, does not tolerate or feels guilty about taking them. Trazodone was taken for the longest period of time.   No past suicide attempt, no past admissions.   Substance Abuse History:  None  Family History:  Parents struggled with depression. Brother  of suicide.   Social History:  Social History     Socioeconomic History    Marital  status:      Spouse name: Not on file    Number of children: Not on file    Years of education: Not on file    Highest education level: Not on file   Occupational History    Not on file   Tobacco Use    Smoking status: Never    Smokeless tobacco: Never   Substance and Sexual Activity    Alcohol use: Yes     Alcohol/week: 1.0 standard drink of alcohol     Types: 1 Glasses of wine per week    Drug use: Not on file    Sexual activity: Not on file   Other Topics Concern    Not on file   Social History Narrative    Not on file     Social Determinants of Health     Financial Resource Strain: Not on file   Food Insecurity: Not on file   Transportation Needs: Not on file   Physical Activity: Not on file   Stress: Not on file   Social Connections: Not on file   Intimate Partner Violence: Not on file   Housing Stability: Not on file           Born in [place], raised by parents, Taoist. One brother. No history of trauma, post graduate.  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 Retired now. 4 children,  for 50 + years.       Review of Systems   Respiratory:  Negative for shortness of breath.    Cardiovascular:  Negative for palpitations.   Gastrointestinal:  Negative for nausea.   Genitourinary:  Negative for difficulty urinating.   Musculoskeletal:  Negative for gait problem.   Skin:  Negative for rash.   Neurological:  Positive for numbness. Negative for tremors and seizures.   Psychiatric/Behavioral:  Positive for decreased concentration and depression. Negative for agitation, behavioral problems, confusion, dysphoric mood, hallucinations, self-injury, sleep disturbance and suicidal ideas. The patient is nervous/anxious and has insomnia. The patient is not hyperactive.        Objective   Vitals:    11/02/23 1407   BP: 176/84   Pulse: 95      Physical Exam    Lab Review:   Orders Only on 09/06/2023   Component Date Value     CARCINOEMBRYONIC AG (NG/* 09/06/2023 2.7 (A)     Creatinine 09/06/2023 0.62     GFR Female 09/06/2023 90    Lab on 06/08/2023   Component Date Value    WBC 06/08/2023 8.0     nRBC 06/08/2023 0.0     RBC 06/08/2023 4.75     Hemoglobin 06/08/2023 14.8     Hematocrit 06/08/2023 44.1     MCV 06/08/2023 93     MCHC 06/08/2023 33.6     Platelets 06/08/2023 222     RDW 06/08/2023 12.5     Neutrophils % 06/08/2023 68.0     Immature Granulocytes %,* 06/08/2023 0.3     Lymphocytes % 06/08/2023 19.4     Monocytes % 06/08/2023 9.5     Eosinophils % 06/08/2023 2.4     Basophils % 06/08/2023 0.4     Neutrophils Absolute 06/08/2023 5.45     Lymphocytes Absolute 06/08/2023 1.55     Monocytes Absolute 06/08/2023 0.76     Eosinophils Absolute 06/08/2023 0.19     Basophils Absolute 06/08/2023 0.03     Glucose 06/08/2023 104 (H)     Sodium 06/08/2023 139     Potassium 06/08/2023 4.2     Chloride 06/08/2023 108 (H)     Bicarbonate 06/08/2023 23     Anion Gap 06/08/2023 12     Urea Nitrogen 06/08/2023 19     Creatinine 06/08/2023 0.51     GFR Female 06/08/2023 >90     Calcium 06/08/2023 8.8     Cholesterol 06/08/2023 173     HDL 06/08/2023 60.0     Cholesterol/HDL Ratio 06/08/2023 2.9     Non-HDL Cholesterol 06/08/2023 113     TSH 06/08/2023 1.97     Vitamin B-12 06/08/2023 620    Orders Only on 05/15/2023   Component Date Value    CARCINOEMBRYONIC AG (NG/* 05/15/2023 2.5      Lab Results   Component Value Date     06/08/2023     06/16/2022     05/12/2021    K 4.2 06/08/2023    K 4.0 06/16/2022    K 4.1 05/12/2021    CO2 23 06/08/2023    CO2 26 06/16/2022    CO2 30 05/12/2021    BUN 19 06/08/2023    BUN 20 06/16/2022    BUN 12 05/12/2021    CREATININE 0.62 09/06/2023    CREATININE 0.51 06/08/2023    CREATININE 0.60 09/26/2022    GLUCOSE 104 (H) 06/08/2023    GLUCOSE 90 06/16/2022    GLUCOSE 88 05/12/2021    CALCIUM 8.8 06/08/2023    CALCIUM 9.2 06/16/2022    CALCIUM 9.6 05/12/2021     Lab Results   Component Value Date     WBC 8.0 06/08/2023    HGB 14.8 06/08/2023    HCT 44.1 06/08/2023    MCV 93 06/08/2023     06/08/2023     Lab Results   Component Value Date    CHOL 173 06/08/2023    TRIG 117 05/12/2021    HDL 60.0 06/08/2023       Assessment/Plan   Problem List Items Addressed This Visit       Recurrent major depressive disorder (CMS/HCC)    NARCISO (generalized anxiety disorder)     Not interested in med management, discussed referral to therapy, she has some from her [facility]. Follow up as needed. Trazodone per PCP.   Krzysztof Escamilla MD

## 2023-11-28 ENCOUNTER — OFFICE VISIT (OUTPATIENT)
Dept: NEUROLOGY | Facility: CLINIC | Age: 78
End: 2023-11-28
Payer: MEDICARE

## 2023-11-28 ENCOUNTER — LAB (OUTPATIENT)
Dept: LAB | Facility: LAB | Age: 78
End: 2023-11-28
Payer: MEDICARE

## 2023-11-28 VITALS
BODY MASS INDEX: 25.26 KG/M2 | WEIGHT: 157.2 LBS | TEMPERATURE: 97.5 F | DIASTOLIC BLOOD PRESSURE: 61 MMHG | HEART RATE: 84 BPM | SYSTOLIC BLOOD PRESSURE: 142 MMHG | RESPIRATION RATE: 18 BRPM | HEIGHT: 66 IN

## 2023-11-28 DIAGNOSIS — G62.89 OTHER POLYNEUROPATHY: ICD-10-CM

## 2023-11-28 DIAGNOSIS — E13.40 NEUROPATHY DUE TO SECONDARY DIABETES (MULTI): ICD-10-CM

## 2023-11-28 LAB
EST. AVERAGE GLUCOSE BLD GHB EST-MCNC: 134 MG/DL
HBA1C MFR BLD: 6.3 %
PROT SERPL-MCNC: 6.7 G/DL (ref 6.4–8.2)
PROT UR-ACNC: 10 MG/DL (ref 5–25)

## 2023-11-28 PROCEDURE — 84165 PROTEIN E-PHORESIS SERUM: CPT | Performed by: STUDENT IN AN ORGANIZED HEALTH CARE EDUCATION/TRAINING PROGRAM

## 2023-11-28 PROCEDURE — 84165 PROTEIN E-PHORESIS SERUM: CPT

## 2023-11-28 PROCEDURE — 1036F TOBACCO NON-USER: CPT | Performed by: PSYCHIATRY & NEUROLOGY

## 2023-11-28 PROCEDURE — 1160F RVW MEDS BY RX/DR IN RCRD: CPT | Performed by: PSYCHIATRY & NEUROLOGY

## 2023-11-28 PROCEDURE — 1126F AMNT PAIN NOTED NONE PRSNT: CPT | Performed by: PSYCHIATRY & NEUROLOGY

## 2023-11-28 PROCEDURE — 99223 1ST HOSP IP/OBS HIGH 75: CPT | Performed by: PSYCHIATRY & NEUROLOGY

## 2023-11-28 PROCEDURE — 84156 ASSAY OF PROTEIN URINE: CPT

## 2023-11-28 PROCEDURE — 84166 PROTEIN E-PHORESIS/URINE/CSF: CPT

## 2023-11-28 PROCEDURE — 1159F MED LIST DOCD IN RCRD: CPT | Performed by: PSYCHIATRY & NEUROLOGY

## 2023-11-28 PROCEDURE — 86334 IMMUNOFIX E-PHORESIS SERUM: CPT

## 2023-11-28 PROCEDURE — 36415 COLL VENOUS BLD VENIPUNCTURE: CPT

## 2023-11-28 PROCEDURE — 86320 SERUM IMMUNOELECTROPHORESIS: CPT | Performed by: STUDENT IN AN ORGANIZED HEALTH CARE EDUCATION/TRAINING PROGRAM

## 2023-11-28 PROCEDURE — 84166 PROTEIN E-PHORESIS/URINE/CSF: CPT | Performed by: PSYCHIATRY & NEUROLOGY

## 2023-11-28 PROCEDURE — 83036 HEMOGLOBIN GLYCOSYLATED A1C: CPT

## 2023-11-28 PROCEDURE — 84155 ASSAY OF PROTEIN SERUM: CPT

## 2023-11-28 RX ORDER — GLUCOSAM/CHONDRO/HERB 149/HYAL 750-100 MG
TABLET ORAL
COMMUNITY

## 2023-11-28 ASSESSMENT — ENCOUNTER SYMPTOMS
FEVER: 0
HEADACHES: 0
EYE PAIN: 0
HALLUCINATIONS: 0

## 2023-11-28 NOTE — PROGRESS NOTES
Consultation:   Subjective     Trinh Vences is a 78 y.o. year old female here as a new patient for neuropathy.   Referred by Gage Angela.    HPI  Numbness in feet over 5-6 years.  B/l, equally.  No burning pain, no electric pains. No major injuries.  No skin issues.   She has been referred to psychiatry for depression.  She has hx of DVT, low back pain.   B12 620, TSH WNL.     Does not have DM.  No smoking. No alcohol use.  No FH of neuropathy.  Back has been okay lately.  Usually the pain is worse on the L side of lower back.  No pain in legs or feet.     Review of Systems   Constitutional:  Negative for fever.   HENT:  Negative for ear pain.    Eyes:  Negative for pain.   Neurological:  Negative for syncope and headaches.   Psychiatric/Behavioral:  Negative for hallucinations and self-injury.    All other systems reviewed and are negative.      Patient Active Problem List   Diagnosis    Abnormal mammogram    Abnormal ultrasound of breast    Acute bronchitis    Acute left lower quadrant pain    Acute UTI    Axillary adenopathy    Back pain    Low back pain, episodic    Atypical chest pain    Breast pain    Chronic GERD    Elevated blood sugar level    Fibrocystic changes of left breast    Fibrocystic changes of right breast    Frequency of micturition    Heel pain    History of depression    Hyperlipidemia    Intraductal papilloma of left breast    Left flank pain    Left groin pain    Cancer of appendix (CMS/HCC)    Neuralgia    Peripheral neuropathy    Plantar fasciitis, left    Presence of IVC filter    PVC (premature ventricular contraction)    Recurrent major depressive disorder (CMS/HCC)    Right ankle swelling    Right leg DVT (CMS/HCC)    Tiredness    NARCISO (generalized anxiety disorder)     Past Medical History:   Diagnosis Date    Anxiety disorder, unspecified     Anxiety    Cystocele, unspecified (CODE) 01/03/2017    Vaginal prolapse    Depression, unspecified     Depressive disorder    Personal  history of diseases of the skin and subcutaneous tissue     History of actinic keratosis    Personal history of malignant neoplasm of other digestive organs 05/06/2020    History of malignant neoplasm of appendix    Personal history of other diseases of the digestive system     History of gastroesophageal reflux (GERD)    Personal history of other diseases of the musculoskeletal system and connective tissue     History of osteopenia    Personal history of other malignant neoplasm of skin     History of basal cell carcinoma    Personal history of other medical treatment     History of screening mammography    Personal history of other specified conditions 06/07/2019    History of urinary frequency    Personal history of other specified conditions     History of atypical nevus    Unilateral primary osteoarthritis, right knee     Primary osteoarthritis of right knee     Past Surgical History:   Procedure Laterality Date    CHOLECYSTECTOMY  09/29/2014    Cholecystectomy    OTHER SURGICAL HISTORY  09/29/2014    Salpingo-oophorectomy    OTHER SURGICAL HISTORY  09/29/2014    Right Hemicolectomy    OTHER SURGICAL HISTORY  09/29/2014    Resection Of Omentum     Social History     Tobacco Use    Smoking status: Never    Smokeless tobacco: Never   Substance Use Topics    Alcohol use: Yes     Alcohol/week: 1.0 standard drink of alcohol     Types: 1 Glasses of wine per week     family history is not on file.    Current Outpatient Medications:     aspirin 81 mg EC tablet, Take 1 tablet (81 mg) by mouth once daily., Disp: , Rfl:     calcium carbonate (Tums) 200 mg calcium chewable tablet, Chew., Disp: , Rfl:     cholecalciferol, vitamin D3, 250 mcg (10,000 unit) capsule, Take by mouth., Disp: , Rfl:     cyanocobalamin (Vitamin B-12) 1,000 mcg tablet, Take 1 tablet (1,000 mcg) by mouth once daily., Disp: , Rfl:     estradiol (Estrace) 0.1 MG/GM vaginal cream, , Disp: , Rfl:     methocarbamol (Robaxin) 500 mg tablet, Take 1 tablet  (500 mg) by mouth 4 times a day as needed for muscle spasms., Disp: 40 tablet, Rfl: 0    mirabegron (Myrbetriq) 25 mg tablet extended release 24 hr 24 hr tablet, Take 1 tablet (25 mg) by mouth once daily., Disp: , Rfl:     omeprazole (PriLOSEC) 20 mg DR capsule, Take 1 capsule (20 mg) by mouth once daily in the morning. Take before meals., Disp: 90 capsule, Rfl: 3    traZODone (Desyrel) 50 mg tablet, Take by mouth., Disp: , Rfl:   Allergies   Allergen Reactions    Ciprofloxacin Other     Tendon and ankle pain    Simvastatin Other     fatigue     There were no vitals taken for this visit.  Neurological Exam/Physical Exam:    Constitutional: General appearance: no acute distress. Pleasant.   Auscultation of Heart: Regular rate and rhythm, no murmurs, normal S1 and S2.   Carotid Arteries: no bruits  Peripheral Vascular Exam: No swelling, edema or tenderness to palpation in extremities  Mental status: no distress, alert, interactive and cooperative. Affect is appropriate.   Orientation: oriented to person, oriented to place and oriented to time.   Memory: recent memory intact and remote memory intact.   Attention: normal attention /concentration.   Language: normal comprehension and naming.   Fund of knowledge: Patient displays adequate knowledge of current events.  Eyes: The ophthalmoscopic examination was normal.   Cranial nerve II: Visual fields full to confrontation.   Cranial nerves III, IV, and VI: Pupils round, equally reactive to light; EOMs intact. No nystagmus.   Cranial Nerve V: Facial sensation intact to LT bilaterally.   Cranial nerve VII: no facial droop  Cranial nerve VIII: Hearing is intact  Cranial nerves IX and X: Palate elevates symmetrically.   Cranial nerve XI: Shoulder shrug intact.   Cranial nerve XII: Tongue is midline.  Motor:  Muscle bulk was normal in both upper and lower extremities.    No atrophy.   Strength is normal.   Deep Tendon Reflexes: left biceps 2+ , right biceps 2+, left triceps  2+, right triceps 2+, left brachioradialis 2+, right brachioradialis 2+, left patella 2+, right patella 2+, left ankle jerk 2+, right ankle jerk 2+   Plantar Reflex: Toes downgoing to plantar stimulation on the left. Toes downgoing to plantar stimulation on the right.   Sensory Exam: dec vibratory sensation b/l  feet but normal in ankles b/l.   Coordination:  no limb dystaxia and rapid alternating movements are intact.   Gait:  cautious.  Romberg present.        Labs:  CBC:   Lab Results   Component Value Date    WBC 8.0 06/08/2023    HGB 14.8 06/08/2023    HCT 44.1 06/08/2023     06/08/2023     BMP:   Lab Results   Component Value Date     06/08/2023    K 4.2 06/08/2023     (H) 06/08/2023    CO2 23 06/08/2023    BUN 19 06/08/2023    CREATININE 0.62 09/06/2023    CALCIUM 8.8 06/08/2023    PHOS 3.4 12/05/2019     LFT:   Lab Results   Component Value Date    ALKPHOS 60 06/16/2022    BILITOT 1.0 06/16/2022    PROT 6.5 06/16/2022    ALBUMIN 4.3 06/16/2022    ALT 15 06/16/2022    AST 14 06/16/2022       Assessment/Plan   Problem List Items Addressed This Visit             ICD-10-CM    Peripheral neuropathy G62.9   Peripheral neuropathy vs. Lumbar radiculopathy.   Obtain neuropathy work up.   Creams/ amitriptyline could be tried if nerve pain started which she does not have now.  PT balance offered. She wants to hold off.

## 2023-11-28 NOTE — PATIENT INSTRUCTIONS
"It was a pleasure seeing you today.     Obtain blood and urine testing.  I want you to get an EMG/nerve conduction test.  Please schedule this for yourself by calling [phone].    This will help evaluate how well your nerves and muscles function.  I will call you if there is anything abnormal.    Consider seeing one of our neuromuscular neurologist - Dr. Izquierdo, Dr. Garcia, Dr. Soriano , Dr. Del Castillo, Dr. Hendricks, and Dr. Bravo are options at .     Follow up in 4-5 months.     For any urgent issues or needing to speak to a medical assistant please call [phone], option 6 during our office hours Monday-Friday 8am-4pm, and leave a voicemail with your concern.  My office will try to reach back you as soon as possible within 24 (business) hours.  If you have an emergency please call 911 or visit a local urgent care or nearest emergency room.      Please understand that foodpanda / hellofood is a useful communication tool for simple \"normal\" results or a refill request but I would not recommend using this tool for emergent or urgent issues or for conversations with me.  I am happy to ask my staff to rearrange a follow up visit or a virtual visit sooner than requested if appropriate for your care.    "

## 2023-11-29 ENCOUNTER — TELEPHONE (OUTPATIENT)
Dept: NEUROLOGY | Facility: CLINIC | Age: 78
End: 2023-11-29
Payer: MEDICARE

## 2023-11-30 LAB
ALBUMIN MFR UR ELPH: 37.4 %
ALPHA1 GLOB MFR UR ELPH: 13 %
ALPHA2 GLOB MFR UR ELPH: 18.3 %
B-GLOBULIN MFR UR ELPH: 18.3 %
GAMMA GLOB MFR UR ELPH: 13 %
PATH REVIEW-URINE PROTEIN ELECTROPHORESIS: NORMAL
URINE ELECTROPHORESIS COMMENT: NORMAL

## 2023-11-30 ASSESSMENT — ENCOUNTER SYMPTOMS
NEUROLOGIC COMPLAINT: 1
LOSS OF BALANCE: 1
FOCAL SENSORY LOSS: 1

## 2023-12-01 ENCOUNTER — HOSPITAL ENCOUNTER (OUTPATIENT)
Dept: NEUROLOGY | Facility: HOSPITAL | Age: 78
Discharge: HOME | End: 2023-12-01
Payer: MEDICARE

## 2023-12-01 DIAGNOSIS — G62.89 OTHER POLYNEUROPATHY: ICD-10-CM

## 2023-12-01 PROCEDURE — 95886 MUSC TEST DONE W/N TEST COMP: CPT | Performed by: PSYCHIATRY & NEUROLOGY

## 2023-12-01 PROCEDURE — 95912 NRV CNDJ TEST 11-12 STUDIES: CPT | Performed by: PSYCHIATRY & NEUROLOGY

## 2023-12-01 PROCEDURE — 95912 NRV CNDJ TEST 11-12 STUDIES: CPT

## 2023-12-05 ENCOUNTER — OFFICE VISIT (OUTPATIENT)
Dept: PRIMARY CARE | Facility: CLINIC | Age: 78
End: 2023-12-05
Payer: MEDICARE

## 2023-12-05 VITALS
BODY MASS INDEX: 25.23 KG/M2 | HEART RATE: 62 BPM | DIASTOLIC BLOOD PRESSURE: 74 MMHG | HEIGHT: 66 IN | TEMPERATURE: 95.4 F | OXYGEN SATURATION: 94 % | SYSTOLIC BLOOD PRESSURE: 132 MMHG | WEIGHT: 157 LBS

## 2023-12-05 DIAGNOSIS — G62.89 OTHER POLYNEUROPATHY: ICD-10-CM

## 2023-12-05 DIAGNOSIS — F33.1 MODERATE EPISODE OF RECURRENT MAJOR DEPRESSIVE DISORDER (MULTI): ICD-10-CM

## 2023-12-05 DIAGNOSIS — C18.1 CANCER OF APPENDIX (MULTI): ICD-10-CM

## 2023-12-05 DIAGNOSIS — Z86.718 HX OF DEEP VENOUS THROMBOSIS: ICD-10-CM

## 2023-12-05 DIAGNOSIS — M79.2 NEURALGIA: ICD-10-CM

## 2023-12-05 DIAGNOSIS — K21.9 CHRONIC GERD: ICD-10-CM

## 2023-12-05 DIAGNOSIS — R73.03 PREDIABETES: Primary | ICD-10-CM

## 2023-12-05 LAB
ALBUMIN: 4.2 G/DL (ref 3.4–5)
ALPHA 1 GLOBULIN: 0.3 G/DL (ref 0.2–0.6)
ALPHA 2 GLOBULIN: 0.6 G/DL (ref 0.4–1.1)
BETA GLOBULIN: 0.7 G/DL (ref 0.5–1.2)
GAMMA GLOBULIN: 0.9 G/DL (ref 0.5–1.4)
IMMUNOFIXATION COMMENT: NORMAL
PATH REVIEW - SERUM IMMUNOFIXATION: NORMAL
PATH REVIEW-SERUM PROTEIN ELECTROPHORESIS: NORMAL
PROTEIN ELECTROPHORESIS COMMENT: NORMAL

## 2023-12-05 PROCEDURE — 1160F RVW MEDS BY RX/DR IN RCRD: CPT | Performed by: INTERNAL MEDICINE

## 2023-12-05 PROCEDURE — 1159F MED LIST DOCD IN RCRD: CPT | Performed by: INTERNAL MEDICINE

## 2023-12-05 PROCEDURE — 1036F TOBACCO NON-USER: CPT | Performed by: INTERNAL MEDICINE

## 2023-12-05 PROCEDURE — 1126F AMNT PAIN NOTED NONE PRSNT: CPT | Performed by: INTERNAL MEDICINE

## 2023-12-05 PROCEDURE — 99214 OFFICE O/P EST MOD 30 MIN: CPT | Performed by: INTERNAL MEDICINE

## 2023-12-05 RX ORDER — TRAZODONE HYDROCHLORIDE 50 MG/1
50 TABLET ORAL NIGHTLY
Qty: 90 TABLET | Refills: 3 | Status: SHIPPED
Start: 2023-12-05 | End: 2023-12-18 | Stop reason: SDUPTHER

## 2023-12-05 RX ORDER — OMEPRAZOLE 20 MG/1
20 CAPSULE, DELAYED RELEASE ORAL
Qty: 90 CAPSULE | Refills: 3 | Status: SHIPPED
Start: 2023-12-05 | End: 2023-12-18 | Stop reason: SDUPTHER

## 2023-12-05 ASSESSMENT — PATIENT HEALTH QUESTIONNAIRE - PHQ9
SUM OF ALL RESPONSES TO PHQ9 QUESTIONS 1 AND 2: 3
2. FEELING DOWN, DEPRESSED OR HOPELESS: NEARLY EVERY DAY
1. LITTLE INTEREST OR PLEASURE IN DOING THINGS: NOT AT ALL

## 2023-12-05 NOTE — PROGRESS NOTES
"My nurse note reviewed. Patient is here for:  Follow up    Hx of depression , appendix cancer, sleep issue  Takes only 25 mg of trazodone. Also was given zoloft which she stopped after one pill. Has seen psychiatrist and refused med so was advised to see psychologist but did not want to do virtual appt so did not see them.   Patient denies any shortness of breath, PND, orthopnea, chest pain , palpitation, syncope or edema in legs  Depression is not improving  OBJECTIVE :  /74   Pulse 62   Temp 35.2 °C (95.4 °F)   Ht 1.676 m (5' 6\")   Wt 71.2 kg (157 lb)   SpO2 94%   BMI 25.34 kg/m²   Looks depressed  CVS: S1 S2 + , no S3. No loud heart murmur appreciated. Lungs clear, No edema  CNS: Patient is alert, oriented moving all 4 extremities well. No motor weakness noted on gross neurological exam. No involuntary movements or tremors noted.  Assessment:  1. Prediabetes -ref to dietician   2. Hx of deep venous thrombosis -doing ok    3. Neuralgia -saw neurologist   4. Other polyneuropathy    5. Cancer of appendix (CMS/HCC) -hx of   6. Moderate episode of recurrent major depressive disorder (CMS/HCC) - med increased.   7. Chronic GERD -refill     Plan  She will increase trazodone to 50 mg at night  If depression symptoms persist then she will call her psychiatrist to prescribe med for it.   Continue other meds  Discussed diet for sugar. Ref to dietician done  Going to [place] for 4 months   F/U in June for Medicare wellness              "

## 2023-12-05 NOTE — PATIENT INSTRUCTIONS
Plan  She will increase trazodone to 50 mg at night  If depression symptoms persist then she will call her psychiatrist to prescribe med for it.   Continue other meds  Discussed diet for sugar. Ref to dietician done  Going to [place] for 4 months   F/U in June for Medicare wellness

## 2023-12-11 ENCOUNTER — ANCILLARY PROCEDURE (OUTPATIENT)
Dept: RADIOLOGY | Facility: CLINIC | Age: 78
End: 2023-12-11
Payer: MEDICARE

## 2023-12-11 DIAGNOSIS — Z12.31 ENCOUNTER FOR SCREENING MAMMOGRAM FOR MALIGNANT NEOPLASM OF BREAST: ICD-10-CM

## 2023-12-11 PROCEDURE — 77067 SCR MAMMO BI INCL CAD: CPT | Mod: BILATERAL PROCEDURE | Performed by: RADIOLOGY

## 2023-12-11 PROCEDURE — 77063 BREAST TOMOSYNTHESIS BI: CPT

## 2023-12-11 PROCEDURE — 77063 BREAST TOMOSYNTHESIS BI: CPT | Mod: BILATERAL PROCEDURE | Performed by: RADIOLOGY

## 2023-12-14 NOTE — PROGRESS NOTES
History Of Present Illness  HPI   The patient is an 78-year-old female who had a left breast needle localization excisional biopsy for an intraductal papilloma on June 7, 2018.  Age of menarche was 9.  G5, P5.  Age of first childbirth was 25.  Last menstrual period in 1995.  The patient follows up after having bilateral mammogram.  She has no breast lumps or pain.  No nipple discharge or retraction.  Family history: Mother had metastatic breast cancer.  Maternal aunt and maternal cousin had breast cancer.    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  Right lower extremity DVT requiring thrombectomy and IVC filter placement.  Depression for which she takes Zoloft  Neuropathy    Past Medical History  She has a past medical history of Anxiety disorder, unspecified, Cystocele, unspecified (CODE) (01/03/2017), Depression, unspecified, Personal history of diseases of the skin and subcutaneous tissue, Personal history of malignant neoplasm of other digestive organs (05/06/2020), Personal history of other diseases of the digestive system, Personal history of other diseases of the musculoskeletal system and connective tissue, Personal history of other malignant neoplasm of skin, Personal history of other medical treatment, Personal history of other specified conditions (06/07/2019), Personal history of other specified conditions, and Unilateral primary osteoarthritis, right knee.    Surgical History  She has a past surgical history that includes Other surgical history (09/29/2014); Other surgical history (09/29/2014); Other surgical history (09/29/2014); Cholecystectomy (09/29/2014); and Breast biopsy (Left).     Allergies  Ciprofloxacin and Simvastatin    Social History  She reports that she has never smoked. She has never used smokeless tobacco. She reports that she does not currently use alcohol. She reports that she does not use drugs.    Family History  Family History  "  Problem Relation Name Age of Onset    Breast cancer Mother      Breast cancer Mother's Sister       Last Recorded Vitals  Blood pressure 158/82, pulse 90, temperature 36.6 °C (97.9 °F), resp. rate 18, height 1.676 m (5' 6\"), weight 71.2 kg (157 lb), SpO2 97 %.    Physical Exam  Constitutional: Well-developed, well-nourished, alert and oriented, no acute distress  Skin: Warm and dry, no lesions, no rashes, no jaundice  HEENT: Normocephalic, atraumatic, EOMI, no scleral icterus, mucous membranes moist, no lesions seen  Neck: Soft, nontender, no mass or adenopathy, no JVD  Cardiac: Regular rate and rhythm, no murmur  Chest: Patent airway, clear to auscultation, normal breath sounds with good chest expansion, no wheezes or rales or rhonchi noted, thorax symmetric  Breast:     right breast: No skin changes, nontender, no mass, mild fibrocystic change    left breast: No skin changes, nontender, no mass, mild fibrocystic change  Abdomen: Nondistended, soft, nontender, no mass  Rectal: Not performed  Extremities: No injury, no lower extremity edema or calf tenderness  Lymphatic: No cervical or axillary adenopathy  Musculoskeletal: Range of motion intact, no joint swelling, normal strength  Neurological: Alert and oriented x3, intact sensory and motor function, no obvious focal neurologic abnormalities  Psychological: Appropriate mood and behavior  Examination chaperoned by Penelope    Relevant Results  I reviewed the screening mammogram report and images from December 11, 2023.  IMPRESSION:  No mammographic evidence of malignancy.  BI-RADS Category:  2 Benign.  Recommendation:  Routine Screening Mammogram in 1 Year.  Recommended Date:  1 Year.  Laterality:  Bilateral.         Assessment/Plan   Diagnoses and all orders for this visit:  Fibrocystic changes of right breast  Fibrocystic changes of left breast  Intraductal papilloma of left breast  Breast exam unremarkable.  Mammogram benign.  Follow-up in 1 year with repeat " bilateral mammogram  Breast exam monthly          Anthony Day MD

## 2023-12-15 ENCOUNTER — OFFICE VISIT (OUTPATIENT)
Dept: SURGERY | Facility: CLINIC | Age: 78
End: 2023-12-15
Payer: MEDICARE

## 2023-12-15 VITALS
DIASTOLIC BLOOD PRESSURE: 82 MMHG | HEART RATE: 90 BPM | RESPIRATION RATE: 18 BRPM | OXYGEN SATURATION: 97 % | BODY MASS INDEX: 25.23 KG/M2 | SYSTOLIC BLOOD PRESSURE: 158 MMHG | TEMPERATURE: 97.9 F | WEIGHT: 157 LBS | HEIGHT: 66 IN

## 2023-12-15 DIAGNOSIS — N60.12 FIBROCYSTIC CHANGES OF LEFT BREAST: ICD-10-CM

## 2023-12-15 DIAGNOSIS — D24.2 INTRADUCTAL PAPILLOMA OF LEFT BREAST: ICD-10-CM

## 2023-12-15 DIAGNOSIS — N60.11 FIBROCYSTIC CHANGES OF RIGHT BREAST: Primary | ICD-10-CM

## 2023-12-15 DIAGNOSIS — Z12.31 SCREENING MAMMOGRAM FOR BREAST CANCER: ICD-10-CM

## 2023-12-15 PROCEDURE — 1160F RVW MEDS BY RX/DR IN RCRD: CPT | Performed by: SURGERY

## 2023-12-15 PROCEDURE — 1126F AMNT PAIN NOTED NONE PRSNT: CPT | Performed by: SURGERY

## 2023-12-15 PROCEDURE — 1036F TOBACCO NON-USER: CPT | Performed by: SURGERY

## 2023-12-15 PROCEDURE — 99213 OFFICE O/P EST LOW 20 MIN: CPT | Performed by: SURGERY

## 2023-12-15 PROCEDURE — 1159F MED LIST DOCD IN RCRD: CPT | Performed by: SURGERY

## 2023-12-15 RX ORDER — SERTRALINE HYDROCHLORIDE 25 MG/1
25 TABLET, FILM COATED ORAL DAILY
COMMUNITY
End: 2023-12-18 | Stop reason: SDUPTHER

## 2023-12-15 SDOH — ECONOMIC STABILITY: FOOD INSECURITY: WITHIN THE PAST 12 MONTHS, YOU WORRIED THAT YOUR FOOD WOULD RUN OUT BEFORE YOU GOT MONEY TO BUY MORE.: NEVER TRUE

## 2023-12-15 SDOH — ECONOMIC STABILITY: FOOD INSECURITY: WITHIN THE PAST 12 MONTHS, THE FOOD YOU BOUGHT JUST DIDN'T LAST AND YOU DIDN'T HAVE MONEY TO GET MORE.: NEVER TRUE

## 2023-12-15 ASSESSMENT — LIFESTYLE VARIABLES
HOW MANY STANDARD DRINKS CONTAINING ALCOHOL DO YOU HAVE ON A TYPICAL DAY: 1 OR 2
AUDIT-C TOTAL SCORE: 1
HOW OFTEN DO YOU HAVE A DRINK CONTAINING ALCOHOL: MONTHLY OR LESS
SKIP TO QUESTIONS 9-10: 1
HOW OFTEN DO YOU HAVE SIX OR MORE DRINKS ON ONE OCCASION: NEVER

## 2023-12-15 ASSESSMENT — PATIENT HEALTH QUESTIONNAIRE - PHQ9
SUM OF ALL RESPONSES TO PHQ9 QUESTIONS 1 & 2: 0
2. FEELING DOWN, DEPRESSED OR HOPELESS: NOT AT ALL
1. LITTLE INTEREST OR PLEASURE IN DOING THINGS: NOT AT ALL

## 2023-12-15 ASSESSMENT — PAIN SCALES - GENERAL: PAINLEVEL: 0-NO PAIN

## 2023-12-15 ASSESSMENT — ENCOUNTER SYMPTOMS
DEPRESSION: 1
LOSS OF SENSATION IN FEET: 1
OCCASIONAL FEELINGS OF UNSTEADINESS: 1

## 2023-12-15 ASSESSMENT — COLUMBIA-SUICIDE SEVERITY RATING SCALE - C-SSRS
1. IN THE PAST MONTH, HAVE YOU WISHED YOU WERE DEAD OR WISHED YOU COULD GO TO SLEEP AND NOT WAKE UP?: NO
2. HAVE YOU ACTUALLY HAD ANY THOUGHTS OF KILLING YOURSELF?: NO
6. HAVE YOU EVER DONE ANYTHING, STARTED TO DO ANYTHING, OR PREPARED TO DO ANYTHING TO END YOUR LIFE?: NO

## 2023-12-15 NOTE — LETTER
December 15, 2023     Gage Angela MD  [address]  [place] [address]    Patient: Trinh Vences   YOB: 1945   Date of Visit: 12/15/2023       Dear Dr. Gage Angela MD:    Thank you for referring Trinh Vences to me for evaluation. Below are my notes for this consultation.  If you have questions, please do not hesitate to call me. I look forward to following your patient along with you.       Sincerely,     Anthony Day MD      CC: No Recipients  ______________________________________________________________________________________    History Of Present Illness  HPI   The patient is an 78-year-old female who had a left breast needle localization excisional biopsy for an intraductal papilloma on June 7, 2018.  Age of menarche was 9.  G5, P5.  Age of first childbirth was 25.  Last menstrual period in 1995.  The patient follows up after having bilateral mammogram.  She has no breast lumps or pain.  No nipple discharge or retraction.  Family history: Mother had metastatic breast cancer.  Maternal aunt and maternal cousin had breast cancer.    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  Right lower extremity DVT requiring thrombectomy and IVC filter placement.  Depression for which she takes Zoloft  Neuropathy    Past Medical History  She has a past medical history of Anxiety disorder, unspecified, Cystocele, unspecified (CODE) (01/03/2017), Depression, unspecified, Personal history of diseases of the skin and subcutaneous tissue, Personal history of malignant neoplasm of other digestive organs (05/06/2020), Personal history of other diseases of the digestive system, Personal history of other diseases of the musculoskeletal system and connective tissue, Personal history of other malignant neoplasm of skin, Personal history of other medical treatment, Personal history of other specified conditions (06/07/2019), Personal history  "of other specified conditions, and Unilateral primary osteoarthritis, right knee.    Surgical History  She has a past surgical history that includes Other surgical history (09/29/2014); Other surgical history (09/29/2014); Other surgical history (09/29/2014); Cholecystectomy (09/29/2014); and Breast biopsy (Left).     Allergies  Ciprofloxacin and Simvastatin    Social History  She reports that she has never smoked. She has never used smokeless tobacco. She reports that she does not currently use alcohol. She reports that she does not use drugs.    Family History  Family History   Problem Relation Name Age of Onset   • Breast cancer Mother     • Breast cancer Mother's Sister       Last Recorded Vitals  Blood pressure 158/82, pulse 90, temperature 36.6 °C (97.9 °F), resp. rate 18, height 1.676 m (5' 6\"), weight 71.2 kg (157 lb), SpO2 97 %.    Physical Exam  Constitutional: Well-developed, well-nourished, alert and oriented, no acute distress  Skin: Warm and dry, no lesions, no rashes, no jaundice  HEENT: Normocephalic, atraumatic, EOMI, no scleral icterus, mucous membranes moist, no lesions seen  Neck: Soft, nontender, no mass or adenopathy, no JVD  Cardiac: Regular rate and rhythm, no murmur  Chest: Patent airway, clear to auscultation, normal breath sounds with good chest expansion, no wheezes or rales or rhonchi noted, thorax symmetric  Breast:     right breast: No skin changes, nontender, no mass, mild fibrocystic change    left breast: No skin changes, nontender, no mass, mild fibrocystic change  Abdomen: Nondistended, soft, nontender, no mass  Rectal: Not performed  Extremities: No injury, no lower extremity edema or calf tenderness  Lymphatic: No cervical or axillary adenopathy  Musculoskeletal: Range of motion intact, no joint swelling, normal strength  Neurological: Alert and oriented x3, intact sensory and motor function, no obvious focal neurologic abnormalities  Psychological: Appropriate mood and " behavior  Examination chaperoned by Penelope    Relevant Results  I reviewed the screening mammogram report and images from December 11, 2023.  IMPRESSION:  No mammographic evidence of malignancy.  BI-RADS Category:  2 Benign.  Recommendation:  Routine Screening Mammogram in 1 Year.  Recommended Date:  1 Year.  Laterality:  Bilateral.         Assessment/Plan  Diagnoses and all orders for this visit:  Fibrocystic changes of right breast  Fibrocystic changes of left breast  Intraductal papilloma of left breast  Breast exam unremarkable.  Mammogram benign.  Follow-up in 1 year with repeat bilateral mammogram  Breast exam monthly          Anthony Day MD

## 2023-12-18 ENCOUNTER — NUTRITION (OUTPATIENT)
Dept: NUTRITION | Facility: CLINIC | Age: 78
End: 2023-12-18
Payer: MEDICARE

## 2023-12-18 VITALS — WEIGHT: 157 LBS | BODY MASS INDEX: 25.23 KG/M2 | HEIGHT: 66 IN

## 2023-12-18 DIAGNOSIS — R73.03 PREDIABETES: Primary | ICD-10-CM

## 2023-12-18 DIAGNOSIS — F33.1 MODERATE EPISODE OF RECURRENT MAJOR DEPRESSIVE DISORDER (MULTI): ICD-10-CM

## 2023-12-18 DIAGNOSIS — K21.9 CHRONIC GERD: ICD-10-CM

## 2023-12-18 DIAGNOSIS — F32.4 MAJOR DEPRESSIVE DISORDER IN PARTIAL REMISSION, UNSPECIFIED WHETHER RECURRENT (CMS-HCC): Primary | ICD-10-CM

## 2023-12-18 PROCEDURE — 97802 MEDICAL NUTRITION INDIV IN: CPT | Performed by: DIETITIAN, REGISTERED

## 2023-12-18 RX ORDER — OMEPRAZOLE 20 MG/1
20 CAPSULE, DELAYED RELEASE ORAL
Qty: 90 CAPSULE | Refills: 3 | Status: SHIPPED | OUTPATIENT
Start: 2023-12-18

## 2023-12-18 RX ORDER — SERTRALINE HYDROCHLORIDE 25 MG/1
25 TABLET, FILM COATED ORAL DAILY
Qty: 90 TABLET | Refills: 3 | Status: SHIPPED | OUTPATIENT
Start: 2023-12-18

## 2023-12-18 RX ORDER — TRAZODONE HYDROCHLORIDE 50 MG/1
50 TABLET ORAL NIGHTLY
Qty: 90 TABLET | Refills: 3 | Status: SHIPPED | OUTPATIENT
Start: 2023-12-18

## 2023-12-18 NOTE — PROGRESS NOTES
"Nutrition Assessment     Reason for Visit:  Trinh Vences is a 78 y.o. female who presents for Nutrition Counseling and Prediabetes    Anthropometrics:  Anthropometrics  Height: 167.6 cm (5' 6\")  Weight: 71.2 kg (157 lb)  BMI (Calculated): 25.35  Weight Change  Weight History / % Weight Change: 156-163# since 2018 per medical record. Weight has been stable per patient    Food And Nutrient Intake:  Food and Nutrient History  Food and Nutrient History: Patient is here to discuss strategies to reduce risk of diabetes. A1C is 6.3%, it had been 5.3% 4 years ago. She denies FH of DM. She has children but denies GDM with any of the pregnancies.  Energy Intake: Good > 75 %  Fluid Intake: water, 2% milk, coffee, a little cranberry juice in water, Wyler's packet in water, OJ some mornings in [place], and fresh-squeezed OJ when they are in [place] 4 months out of the year  Food Allergy: none  Food Intolerance: none  GI Symptoms: none  Oral Problems: denies     Food Intake  Meal 1: B: 6:30, 6 days per week she eats the same breakfast, about 1 cup Cheerios, half a banana, some berries, 1/2 English muffin, a small glass of OJ, coffee with creamer. 1 day per week she dines out for breakfast: pancakes with berries and a little whipped cream  Meal 2: L: 11:30-12, half or a whole deli meat sandwich prepared on whole wheat bread with sides of carrots & celery, chips, fruit (half an apple and half a pear), milk, and 3 wafer crackers or one chocolate truffle for dessert  Meal 3: D: pork chop with scalloped potato, homemade unsweetened applesauce, or half of a bone-in chicken breast with mashed potato, carrot, gravy and salad, or spaghetti with salad and a slice of garlic toast, or shrimp stir hernandes or shrimp with sergio noodles  Snacks: afternoon: handful of peanuts    Food Preparation  Cooking: Patient  Grocery Shopping: Patient  Dining Out: 1 to 3 times a week (usually 1/week, during the holiday season a little more often at " 3/week)    Macronutrient Intake  Omega-3 Fatty Acids: supplement    Micronutrient Intake  Vitamin Intake: D, B12  Mineral/Element Intake: Magnesium, Calcium    Food And Nutrient Administration:  Diet Experience  Previous Diet / Nutrition Education / Counseling: She reports she was raised in a household where she learned to create balanced meals, she recognized the Plate Method right away and said that the variety of foods look like their meals    Physical Activities:  Physical Activity  Type of Physical Activity: She is in a hiking club, they hike about 30 miles per month, 3-4 miles per hike. She is busy around the house with gardening in the summer. 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 She and her  stay in [place] for 4 months starting in January each year and she said they are physically active there, walking.    Nutrition Focused Physical Exam:  Defer, she does not appear malnourished. She appears quite fit and younger than her stated age.     Nutrition Diagnosis   Malnutrition Diagnosis  Patient has Malnutrition Diagnosis: No  Nutrition Diagnosis  Patient has Nutrition Diagnosis: Yes  Diagnosis Status (1): New  Nutrition Diagnosis 1: Altered nutrition related to laboratory values  Related to (1): unclear etiology causing endocrine dysfunction  As Evidenced by (1): A1C of 6.3% is in the pre-diabetes range    Nutrition Interventions/Recommendations   Nutrition Prescription  Individualized Nutrition Prescription Provided for : Carbohydrate-controlled diet using the Plate Method  Food and Nutrition Delivery  Meals & Snacks: Carbohydrate-modified diet, Modify Composition of Meals/Snacks  Nutrition Education  Nutrition Education Content: Physical activity guidance    Patient Instructions   Explained what happens in the body when pre-diabetes and diabetes develop, and why providing consistent amounts of carbohydrate in the diet is helpful for regulating blood sugar.  Encouraged choosing foods that contain complex carbohydrate, such as high-fiber whole grains, beans, starchy vegetables that have been minimally processed during cooking, and whole fruits rather than more simple sugars like fruit juice, sugar-sweetened beverages, foods with added sugar, and milk. Also advised that the other foods on the plate impact blood sugar control, such as foods that are high in protein, foods that contribute some fat, and non-starchy vegetables.      Talked about how some of the foods that have carbohydrate are foods she has a preference to consume, such as fresh squeezed juice when they are in [place], or glasses of milk to achieve calcium intake. Discussed she can off-set carbohydrate from those foods by reducing carbohydrate elsewhere (have an omelet or eggs instead of Cheerios if she's consuming juice with breakfast), and she can also promote lower post-meal blood sugar levels by being physically active for a short period around meal times. Told her about some lower carbohydrate products she can try, such as low-sugar calcium-fortified orange juice (Tropicana Light with calcium), or low carbohydrate wraps she could use in place of bread (Digital Fuel Carb Balance). Also gave her a list of the carbohydrate content of various foods to give her ideas of the portions to consume - such as berries can be a little larger portion compared to other fruits. Told her about 4 choices off of the carbohydrate list are advised per meal.     Utilize the Plate Method at meals in order to balance the types and amounts of food on the plate.   - half of the plate full of non-starchy vegetables. These foods contribute very little carbohydrate to the plate, and they add fiber to the meal. Salad, carrots, bell peppers, zucchini, broccoli, cauliflower, asparagus, radishes, carrots and green beans are a few examples of these foods. They can be served cooked or raw.    - one quarter of plate including protein foods.  Examples can include meat, nuts, seeds, cheese, cottage cheese, nut butter, fish, seafood, tofu, and edamame.    - one quarter of the plate including carbohydrate foods. These foods include grains, fruit, legumes, starchy vegetables, milk and yogurt. Aim to eat at least half of the daily grains as whole grains.    Nutrition Monitoring and Evaluation   Food/Nutrient Related History Monitoring  Monitoring and Evaluation Plan: Carbohydrate intake  Estimated carbohydrate intake: Estimated carbohydrate intake  Criteria: She will reduce carbohydrate intake at meals to be about 60 grams by confining carbohydrate at the meal to about 1/4 of the plate and making some food swaps to reduce carbohydrate intake  Knowledge/Beliefs/Attitudes  Monitoring and Evaluation Plan: Physical activity  Physical activity: Frequency  Criteria: She will go for a 10 minute walk after breakfasts in [place] when she drinks fresh orange juice, and will also go for a 10 minute walk after meals that are higher in carbohydrate    Readiness to Change : Good  Level of Understanding : Good  Anticipated Compliant : Good

## 2023-12-18 NOTE — PATIENT INSTRUCTIONS
Explained what happens in the body when pre-diabetes and diabetes develop, and why providing consistent amounts of carbohydrate in the diet is helpful for regulating blood sugar. Encouraged choosing foods that contain complex carbohydrate, such as high-fiber whole grains, beans, starchy vegetables that have been minimally processed during cooking, and whole fruits rather than more simple sugars like fruit juice, sugar-sweetened beverages, foods with added sugar, and milk. Also advised that the other foods on the plate impact blood sugar control, such as foods that are high in protein, foods that contribute some fat, and non-starchy vegetables.      Talked about how some of the foods that have carbohydrate are foods she has a preference to consume, such as fresh squeezed juice when they are in [place], or glasses of milk to achieve calcium intake. Discussed she can off-set carbohydrate from those foods by reducing carbohydrate elsewhere (have an omelet or eggs instead of Cheerios if she's consuming juice with breakfast), and she can also promote lower post-meal blood sugar levels by being physically active for a short period around meal times. Told her about some lower carbohydrate products she can try, such as low-sugar calcium-fortified orange juice (Tropicana Light with calcium), or low carbohydrate wraps she could use in place of bread (Metaplace Carb Balance). Also gave her a list of the carbohydrate content of various foods to give her ideas of the portions to consume - such as berries can be a little larger portion compared to other fruits. Told her about 4 choices off of the carbohydrate list are advised per meal.     Utilize the Plate Method at meals in order to balance the types and amounts of food on the plate.   - half of the plate full of non-starchy vegetables. These foods contribute very little carbohydrate to the plate, and they add fiber to the meal. Salad, carrots, bell peppers, zucchini, broccoli,  cauliflower, asparagus, radishes, carrots and green beans are a few examples of these foods. They can be served cooked or raw.    - one quarter of plate including protein foods. Examples can include meat, nuts, seeds, cheese, cottage cheese, nut butter, fish, seafood, tofu, and edamame.    - one quarter of the plate including carbohydrate foods. These foods include grains, fruit, legumes, starchy vegetables, milk and yogurt. Aim to eat at least half of the daily grains as whole grains.

## 2024-05-08 DIAGNOSIS — C18.1 CANCER OF APPENDIX (MULTI): ICD-10-CM

## 2024-05-13 ENCOUNTER — LAB (OUTPATIENT)
Dept: LAB | Facility: LAB | Age: 79
End: 2024-05-13
Payer: MEDICARE

## 2024-05-13 DIAGNOSIS — C18.1 CANCER OF APPENDIX (MULTI): ICD-10-CM

## 2024-05-13 LAB — CEA SERPL-MCNC: 2.4 UG/L

## 2024-05-13 PROCEDURE — 82378 CARCINOEMBRYONIC ANTIGEN: CPT

## 2024-05-13 PROCEDURE — 36415 COLL VENOUS BLD VENIPUNCTURE: CPT

## 2024-06-12 ENCOUNTER — APPOINTMENT (OUTPATIENT)
Dept: PRIMARY CARE | Facility: CLINIC | Age: 79
End: 2024-06-12
Payer: MEDICARE

## 2024-06-12 ENCOUNTER — TELEPHONE (OUTPATIENT)
Dept: GASTROENTEROLOGY | Facility: CLINIC | Age: 79
End: 2024-06-12

## 2024-06-12 ENCOUNTER — LAB (OUTPATIENT)
Dept: LAB | Facility: LAB | Age: 79
End: 2024-06-12
Payer: MEDICARE

## 2024-06-12 VITALS
TEMPERATURE: 97.5 F | OXYGEN SATURATION: 98 % | SYSTOLIC BLOOD PRESSURE: 122 MMHG | BODY MASS INDEX: 24.69 KG/M2 | HEART RATE: 71 BPM | DIASTOLIC BLOOD PRESSURE: 68 MMHG | WEIGHT: 153 LBS

## 2024-06-12 DIAGNOSIS — Z13.39 ALCOHOL SCREENING: ICD-10-CM

## 2024-06-12 DIAGNOSIS — G62.89 OTHER POLYNEUROPATHY: ICD-10-CM

## 2024-06-12 DIAGNOSIS — E78.5 HYPERLIPIDEMIA, UNSPECIFIED HYPERLIPIDEMIA TYPE: ICD-10-CM

## 2024-06-12 DIAGNOSIS — E13.40 NEUROPATHY DUE TO SECONDARY DIABETES (MULTI): ICD-10-CM

## 2024-06-12 DIAGNOSIS — Z86.718 HX OF DEEP VENOUS THROMBOSIS: ICD-10-CM

## 2024-06-12 DIAGNOSIS — R73.03 PREDIABETES: ICD-10-CM

## 2024-06-12 DIAGNOSIS — Z13.31 DEPRESSION SCREEN: ICD-10-CM

## 2024-06-12 DIAGNOSIS — Z78.0 POSTMENOPAUSAL: ICD-10-CM

## 2024-06-12 DIAGNOSIS — Z71.89 ADVANCE DIRECTIVE DISCUSSED WITH PATIENT: ICD-10-CM

## 2024-06-12 DIAGNOSIS — C18.1 CANCER OF APPENDIX (MULTI): ICD-10-CM

## 2024-06-12 DIAGNOSIS — Z00.00 MEDICARE ANNUAL WELLNESS VISIT, SUBSEQUENT: Primary | ICD-10-CM

## 2024-06-12 DIAGNOSIS — F33.1 MODERATE EPISODE OF RECURRENT MAJOR DEPRESSIVE DISORDER (MULTI): ICD-10-CM

## 2024-06-12 DIAGNOSIS — Z00.00 ANNUAL PHYSICAL EXAM: ICD-10-CM

## 2024-06-12 DIAGNOSIS — I82.491 DEEP VEIN THROMBOSIS (DVT) OF OTHER VEIN OF RIGHT LOWER EXTREMITY, UNSPECIFIED CHRONICITY (MULTI): ICD-10-CM

## 2024-06-12 LAB
ALBUMIN SERPL BCP-MCNC: 4.5 G/DL (ref 3.4–5)
ALP SERPL-CCNC: 77 U/L (ref 33–136)
ALT SERPL W P-5'-P-CCNC: 20 U/L (ref 7–45)
ANION GAP SERPL CALC-SCNC: 11 MMOL/L (ref 10–20)
AST SERPL W P-5'-P-CCNC: 17 U/L (ref 9–39)
BASOPHILS # BLD AUTO: 0.04 X10*3/UL (ref 0–0.1)
BASOPHILS NFR BLD AUTO: 0.4 %
BILIRUB DIRECT SERPL-MCNC: 0.1 MG/DL (ref 0–0.3)
BILIRUB SERPL-MCNC: 0.7 MG/DL (ref 0–1.2)
BUN SERPL-MCNC: 17 MG/DL (ref 6–23)
CALCIUM SERPL-MCNC: 10.2 MG/DL (ref 8.6–10.3)
CHLORIDE SERPL-SCNC: 102 MMOL/L (ref 98–107)
CHOLEST SERPL-MCNC: 186 MG/DL (ref 0–199)
CHOLESTEROL/HDL RATIO: 2.5
CO2 SERPL-SCNC: 31 MMOL/L (ref 21–32)
CREAT SERPL-MCNC: 0.63 MG/DL (ref 0.5–1.05)
EGFRCR SERPLBLD CKD-EPI 2021: 90 ML/MIN/1.73M*2
EOSINOPHIL # BLD AUTO: 0.09 X10*3/UL (ref 0–0.4)
EOSINOPHIL NFR BLD AUTO: 0.9 %
ERYTHROCYTE [DISTWIDTH] IN BLOOD BY AUTOMATED COUNT: 12.8 % (ref 11.5–14.5)
EST. AVERAGE GLUCOSE BLD GHB EST-MCNC: 111 MG/DL
GLUCOSE SERPL-MCNC: 85 MG/DL (ref 74–99)
HBA1C MFR BLD: 5.5 %
HCT VFR BLD AUTO: 48.8 % (ref 36–46)
HDLC SERPL-MCNC: 73 MG/DL
HGB BLD-MCNC: 16.3 G/DL (ref 12–16)
IMM GRANULOCYTES # BLD AUTO: 0.03 X10*3/UL (ref 0–0.5)
IMM GRANULOCYTES NFR BLD AUTO: 0.3 % (ref 0–0.9)
LYMPHOCYTES # BLD AUTO: 1.83 X10*3/UL (ref 0.8–3)
LYMPHOCYTES NFR BLD AUTO: 18 %
MAGNESIUM SERPL-MCNC: 2.31 MG/DL (ref 1.6–2.4)
MCH RBC QN AUTO: 30.7 PG (ref 26–34)
MCHC RBC AUTO-ENTMCNC: 33.4 G/DL (ref 32–36)
MCV RBC AUTO: 92 FL (ref 80–100)
MONOCYTES # BLD AUTO: 0.88 X10*3/UL (ref 0.05–0.8)
MONOCYTES NFR BLD AUTO: 8.7 %
NEUTROPHILS # BLD AUTO: 7.3 X10*3/UL (ref 1.6–5.5)
NEUTROPHILS NFR BLD AUTO: 71.7 %
NON-HDL CHOLESTEROL: 113 MG/DL (ref 0–149)
NRBC BLD-RTO: 0 /100 WBCS (ref 0–0)
PLATELET # BLD AUTO: 266 X10*3/UL (ref 150–450)
POTASSIUM SERPL-SCNC: 4.5 MMOL/L (ref 3.5–5.3)
PROT SERPL-MCNC: 6.9 G/DL (ref 6.4–8.2)
RBC # BLD AUTO: 5.31 X10*6/UL (ref 4–5.2)
SODIUM SERPL-SCNC: 139 MMOL/L (ref 136–145)
TSH SERPL-ACNC: 1.95 MIU/L (ref 0.44–3.98)
WBC # BLD AUTO: 10.2 X10*3/UL (ref 4.4–11.3)

## 2024-06-12 PROCEDURE — G0442 ANNUAL ALCOHOL SCREEN 15 MIN: HCPCS | Performed by: INTERNAL MEDICINE

## 2024-06-12 PROCEDURE — G0439 PPPS, SUBSEQ VISIT: HCPCS | Performed by: INTERNAL MEDICINE

## 2024-06-12 PROCEDURE — 83735 ASSAY OF MAGNESIUM: CPT

## 2024-06-12 PROCEDURE — 82248 BILIRUBIN DIRECT: CPT

## 2024-06-12 PROCEDURE — 84443 ASSAY THYROID STIM HORMONE: CPT

## 2024-06-12 PROCEDURE — 82465 ASSAY BLD/SERUM CHOLESTEROL: CPT

## 2024-06-12 PROCEDURE — G0444 DEPRESSION SCREEN ANNUAL: HCPCS | Performed by: INTERNAL MEDICINE

## 2024-06-12 PROCEDURE — 36415 COLL VENOUS BLD VENIPUNCTURE: CPT

## 2024-06-12 PROCEDURE — 80053 COMPREHEN METABOLIC PANEL: CPT

## 2024-06-12 PROCEDURE — 1170F FXNL STATUS ASSESSED: CPT | Performed by: INTERNAL MEDICINE

## 2024-06-12 PROCEDURE — 99497 ADVNCD CARE PLAN 30 MIN: CPT | Performed by: INTERNAL MEDICINE

## 2024-06-12 PROCEDURE — 99213 OFFICE O/P EST LOW 20 MIN: CPT | Performed by: INTERNAL MEDICINE

## 2024-06-12 PROCEDURE — 85025 COMPLETE CBC W/AUTO DIFF WBC: CPT

## 2024-06-12 PROCEDURE — 1159F MED LIST DOCD IN RCRD: CPT | Performed by: INTERNAL MEDICINE

## 2024-06-12 PROCEDURE — 83718 ASSAY OF LIPOPROTEIN: CPT

## 2024-06-12 PROCEDURE — 83036 HEMOGLOBIN GLYCOSYLATED A1C: CPT

## 2024-06-12 PROCEDURE — 99397 PER PM REEVAL EST PAT 65+ YR: CPT | Performed by: INTERNAL MEDICINE

## 2024-06-12 ASSESSMENT — ACTIVITIES OF DAILY LIVING (ADL)
MANAGING_FINANCES: INDEPENDENT
DOING_HOUSEWORK: INDEPENDENT
BATHING: INDEPENDENT
DRESSING: INDEPENDENT
MANAGING_FINANCES: INDEPENDENT
GROCERY_SHOPPING: INDEPENDENT
DRESSING: INDEPENDENT
TAKING_MEDICATION: INDEPENDENT
TAKING_MEDICATION: INDEPENDENT
BATHING: INDEPENDENT
DOING_HOUSEWORK: INDEPENDENT
GROCERY_SHOPPING: INDEPENDENT

## 2024-06-12 ASSESSMENT — PATIENT HEALTH QUESTIONNAIRE - PHQ9
SUM OF ALL RESPONSES TO PHQ9 QUESTIONS 1 AND 2: 2
2. FEELING DOWN, DEPRESSED OR HOPELESS: SEVERAL DAYS
1. LITTLE INTEREST OR PLEASURE IN DOING THINGS: SEVERAL DAYS

## 2024-06-12 NOTE — PATIENT INSTRUCTIONS
Plan  Medicare wellness done.   Annual physical done. Tests ordered  Bone density test.  She will call  Neurologist Dr Lo for f/U on neuropathy symptoms  She will decrease omeprazole to every other day for a month then discontinue  Current medications are effective. advised to continue current medications.  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

## 2024-06-12 NOTE — TELEPHONE ENCOUNTER
Patient will like to schedule a colonoscopy. She has an order from internal medicine and is willingly to travel to [place]

## 2024-06-12 NOTE — PROGRESS NOTES
My nurse note reviewed. Patient is here for:  Medicare Annual Wellness Visit Subsequent (Would like to discuss colonoscopy)       Pt is here for medicare wellness, physical and follow up   Here with   Patient denies any shortness of breath, PND, orthopnea, chest pain , palpitation, syncope or edema in legs  patient denies any abdominal pain, tenderness, nausea, vomiting, change in bowel habits or blood in stool.  Patient denies any weakness in extremities.. Denies any headache, visual symptoms , speech problems or  tremors . No TIA or stroke like symptoms..  Patient denies any weakness in extremities.. Denies any headache, visual symptoms , speech problems or  tremors . No TIA or stroke like symptoms..    Taking meds oK     Had EMG/NCV done last yr , wanted to see results . Discussed results . Told no signi polyneuropathy noted. She thinks her neuropathy is progressing. Sometimes issue with balance    Over the past 2 weeks, how often have you been bothered by any of the following problems?  Little interest or pleasure in doing things: Several days  Feeling down, depressed, or hopeless: Several days  Functional Ability/Level of Safety  Cognitive Impairment Observed: No cognitive impairment observed  Cognitive Impairment Reported: No cognitive impairment reported by patient or family  Nutrition and Exercise  Current Diet: Well Balanced Diet  Adequate Fluid Intake: No  Caffeine: No  Exercise Frequency: Regularly  IADL's  Grocery Shopping: Independent  Doing Housework: Independent  Taking Medication: Independent  Managing Finances: Independent  [place] Home Safety Risk Factors  Home Safety Risk Factors: None     During Medicare wellness apart from looking at assessment done by nurse,  I also asked following :    Alcohol use : Alcohol screening  was  done during this visit. Patient is not having any issue with increase alcohol use . No ETOH abuse was observed by history .    HIV test: patient was not found to be  high risk for HIV     Cognitive issue : None     Advanced Directive: Patient has advanced directive including living will and Health care power of . Health POA is  . All questions related to it answered. Total time > 16 min.     I have reviewed all active medications patient is currently on . Questions related to medication answered to patient's satisfaction.   Wants to stop omeprazole. Worried about dementia from it. No heartburn lately   REVIEW OF SYSTEMS:   All other systems have been reviewed and are negative in relation to patient's complaint and other than what is mentioned in History of present illness.    OBJECTIVE :    /68   Pulse 71   Temp 36.4 °C (97.5 °F)   Wt 69.4 kg (153 lb)   SpO2 98%   BMI 24.69 kg/m²   Vitals noted  Not in acute distress  Conj Pink, No icterus  ears exam normal  Neck:No Cervical LN enlargement, No Thyroid enlargement No carotid bruit  Lungs: good air entry bilaterally, no rales or rhonchi  CVS: S1 S2 + , no S3. No loud heart murmur heard.   Abdomen: Soft, non tender , BS +. no organomegaly , no CVA tenderness  MSK: No spine tenderness or muscle tenderness noted on gross examination  CNS: Pt is alert, moving all 4 extremities. no motor weakness or abnormal movements noted on gross examination.  Extremities: No edema, No calf tenderness, Kenji's sign negative. DTR + knee and wrists, Rhomberg's neg    Assessment:  1. Medicare annual wellness visit, subsequent  Done.       2. Annual physical exam  Done. Tests ordered      3. Alcohol screening        4. Advance directive discussed with patient        5. Depression screen        6. Prediabetes  Hemoglobin A1C      7. Moderate episode of recurrent major depressive disorder (Multi)  On med. Not taking zoloft anymore      8. Cancer of appendix (Multi)  Colonoscopy Screening; High Risk Patient; hx of appendix cancer      9. Hyperlipidemia, unspecified hyperlipidemia type  CBC and Auto Differential    Basic Metabolic  Panel    Lipid Panel Non-Fasting    Hepatic Function Panel    Thyroid Stimulating Hormone      10. Other polyneuropathy  Sees neuro.      11. Hx of deep venous thrombosis  After long trip      12. 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 (Multi)  Magnesium      13. Deep vein thrombosis (DVT) of other vein of right lower extremity, unspecified chronicity (Multi)  Hx of.       14. Postmenopausal  XR DEXA bone density        Plan  Medicare wellness done.   Annual physical done. Tests ordered  Bone density test.  She will call  Neurologist Dr Lo for f/U on neuropathy symptoms  She will decrease omeprazole to every other day for a month then discontinue  Current medications are effective. advised to continue current medications.  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

## 2024-06-13 NOTE — TELEPHONE ENCOUNTER
I called the patient and left a voicemail to call back to schedule a appointment for a colonoscopy consult

## 2024-06-15 ENCOUNTER — HOSPITAL ENCOUNTER (OUTPATIENT)
Dept: RADIOLOGY | Facility: CLINIC | Age: 79
Discharge: HOME | End: 2024-06-15
Payer: MEDICARE

## 2024-06-15 DIAGNOSIS — Z78.0 POSTMENOPAUSAL: ICD-10-CM

## 2024-06-15 PROCEDURE — 77080 DXA BONE DENSITY AXIAL: CPT | Performed by: RADIOLOGY

## 2024-06-15 PROCEDURE — 77080 DXA BONE DENSITY AXIAL: CPT

## 2024-06-17 DIAGNOSIS — Z12.11 SCREENING FOR COLON CANCER: Primary | ICD-10-CM

## 2024-06-19 RX ORDER — SODIUM, POTASSIUM,MAG SULFATES 17.5-3.13G
SOLUTION, RECONSTITUTED, ORAL ORAL
Qty: 354 ML | Refills: 0 | Status: SHIPPED | OUTPATIENT
Start: 2024-06-19

## 2024-06-20 ENCOUNTER — TELEPHONE (OUTPATIENT)
Dept: PRIMARY CARE | Facility: CLINIC | Age: 79
End: 2024-06-20
Payer: MEDICARE

## 2024-06-20 NOTE — TELEPHONE ENCOUNTER
----- Message from Gage Angela MD sent at 6/16/2024  8:46 AM EDT -----  Please make virtual or phone appointment  in few  weeks to  go over patient's abnormal  test result and to decide on further intervention . Please notify patient. Thanks.

## 2024-06-24 ENCOUNTER — APPOINTMENT (OUTPATIENT)
Dept: PRIMARY CARE | Facility: CLINIC | Age: 79
End: 2024-06-24
Payer: MEDICARE

## 2024-06-24 DIAGNOSIS — M81.0 AGE-RELATED OSTEOPOROSIS WITHOUT CURRENT PATHOLOGICAL FRACTURE: Primary | ICD-10-CM

## 2024-06-24 PROCEDURE — 99442 PR PHYS/QHP TELEPHONE EVALUATION 11-20 MIN: CPT | Performed by: INTERNAL MEDICINE

## 2024-06-24 RX ORDER — ALENDRONATE SODIUM 70 MG/1
70 TABLET ORAL
Qty: 13 TABLET | Refills: 3 | Status: SHIPPED | OUTPATIENT
Start: 2024-06-24 | End: 2025-06-24

## 2024-06-24 NOTE — PROGRESS NOTES
" \"This visit was completed via telephone due to the restrictions of the COVID-19 pandemic and/or other reason. All issues as below were discussed and addressed but no physical exam was performed. If it was felt that the patient should be evaluated in clinic then they were directed there. The patient verbally consented to visit.\"    Talked to patient over phone .    pt had DEXA scan done which was abnormal and showed Osteoporosis . Discussed results with patient over phone. All questions related to it answered . Discussed about starting new medication for Osteoporosis, Fosamax. Patient agreed with plan. Questions related to medication answered with patient. Common side effects discussed. Patient  understood it well.    OBJECTIVE :  Over phone patient didn't seem to be  in any acute distress . patient is alert, oriented, answering appropriately to questions asked.  Physical exam was not performed due to telephone visit .    Assessment:  Osteoporosis    Plan:  Discussed about osteoporosis and management of it.   Discussed medication Fosamax. Side effects discussed. Patient agreed to start medication . Prescription sent to Pharmacy   Regular exercise discussed  Calcium and vit D supplements were discussed   Suggested to repeat DEXA in 2-3 years  Patient agreed with plan and felt satisfied  I spent more than 12 minutes of time with patient discussing diagnosis, management and coordinating care . All questions were answered to patients satisfaction . Patient felt satisfied with it.    "

## 2024-07-10 ENCOUNTER — ANESTHESIA EVENT (OUTPATIENT)
Dept: GASTROENTEROLOGY | Facility: EXTERNAL LOCATION | Age: 79
End: 2024-07-10

## 2024-07-19 ENCOUNTER — ANESTHESIA (OUTPATIENT)
Dept: GASTROENTEROLOGY | Facility: EXTERNAL LOCATION | Age: 79
End: 2024-07-19

## 2024-07-19 ENCOUNTER — APPOINTMENT (OUTPATIENT)
Dept: GASTROENTEROLOGY | Facility: EXTERNAL LOCATION | Age: 79
End: 2024-07-19
Payer: MEDICARE

## 2024-07-19 VITALS
BODY MASS INDEX: 24.11 KG/M2 | HEART RATE: 74 BPM | OXYGEN SATURATION: 98 % | DIASTOLIC BLOOD PRESSURE: 69 MMHG | WEIGHT: 150 LBS | HEIGHT: 66 IN | RESPIRATION RATE: 13 BRPM | SYSTOLIC BLOOD PRESSURE: 126 MMHG | TEMPERATURE: 97 F

## 2024-07-19 DIAGNOSIS — C18.1 CANCER OF APPENDIX (MULTI): Primary | ICD-10-CM

## 2024-07-19 PROBLEM — N64.4 BREAST PAIN: Status: RESOLVED | Noted: 2023-06-05 | Resolved: 2024-07-19

## 2024-07-19 PROBLEM — R07.89 ATYPICAL CHEST PAIN: Status: RESOLVED | Noted: 2023-06-05 | Resolved: 2024-07-19

## 2024-07-19 PROBLEM — E78.5 HYPERLIPIDEMIA: Status: RESOLVED | Noted: 2023-06-05 | Resolved: 2024-07-19

## 2024-07-19 PROBLEM — I49.3 PVC (PREMATURE VENTRICULAR CONTRACTION): Status: RESOLVED | Noted: 2023-06-05 | Resolved: 2024-07-19

## 2024-07-19 PROCEDURE — 45380 COLONOSCOPY AND BIOPSY: CPT | Performed by: INTERNAL MEDICINE

## 2024-07-19 PROCEDURE — 88305 TISSUE EXAM BY PATHOLOGIST: CPT | Performed by: PATHOLOGY

## 2024-07-19 PROCEDURE — 88305 TISSUE EXAM BY PATHOLOGIST: CPT | Mod: TC,ELYLAB | Performed by: INTERNAL MEDICINE

## 2024-07-19 RX ORDER — SODIUM CHLORIDE, SODIUM LACTATE, POTASSIUM CHLORIDE, CALCIUM CHLORIDE 600; 310; 30; 20 MG/100ML; MG/100ML; MG/100ML; MG/100ML
20 INJECTION, SOLUTION INTRAVENOUS CONTINUOUS
Status: DISCONTINUED | OUTPATIENT
Start: 2024-07-19 | End: 2024-07-20 | Stop reason: HOSPADM

## 2024-07-19 RX ORDER — LIDOCAINE HYDROCHLORIDE 20 MG/ML
INJECTION, SOLUTION INFILTRATION; PERINEURAL AS NEEDED
Status: DISCONTINUED | OUTPATIENT
Start: 2024-07-19 | End: 2024-07-19

## 2024-07-19 RX ORDER — PROPOFOL 10 MG/ML
INJECTION, EMULSION INTRAVENOUS AS NEEDED
Status: DISCONTINUED | OUTPATIENT
Start: 2024-07-19 | End: 2024-07-19

## 2024-07-19 SDOH — HEALTH STABILITY: MENTAL HEALTH: CURRENT SMOKER: 0

## 2024-07-19 ASSESSMENT — PAIN SCALES - GENERAL
PAINLEVEL_OUTOF10: 0 - NO PAIN
PAIN_LEVEL: 0
PAINLEVEL_OUTOF10: 0 - NO PAIN

## 2024-07-19 ASSESSMENT — COLUMBIA-SUICIDE SEVERITY RATING SCALE - C-SSRS
6. HAVE YOU EVER DONE ANYTHING, STARTED TO DO ANYTHING, OR PREPARED TO DO ANYTHING TO END YOUR LIFE?: NO
1. IN THE PAST MONTH, HAVE YOU WISHED YOU WERE DEAD OR WISHED YOU COULD GO TO SLEEP AND NOT WAKE UP?: NO
2. HAVE YOU ACTUALLY HAD ANY THOUGHTS OF KILLING YOURSELF?: NO

## 2024-07-19 ASSESSMENT — PAIN - FUNCTIONAL ASSESSMENT
PAIN_FUNCTIONAL_ASSESSMENT: 0-10

## 2024-07-19 NOTE — Clinical Note
Pt tolerated procedure well. Abd soft and non-tender. Transported to recovery for face to face handoff.

## 2024-07-19 NOTE — DISCHARGE INSTRUCTIONS
Patient Instructions after a Colonoscopy      The anesthetics, sedatives or narcotics which were given to you today will be acting in your body for the next 24 hours, so you might feel a little sleepy or groggy.  This feeling should slowly wear off. Carefully read and follow the instructions.     You received sedation today:  - Do not drive or operate any machinery or power tools of any kind.   - No alcoholic beverages today, not even beer or wine.  - Do not make any important decisions or sign any legal documents.  - No over the counter medications that contain alcohol or that may cause drowsiness.  - Do not make any important decisions or sign any legal documents.  - Make sure you have someone with you for first 24 hours.    While it is common to experience mild to moderate abdominal distention, gas, or belching after your procedure, if any of these symptoms occur following discharge from the GI Lab or within one week of having your procedure, call the [facility] to be advised whether a visit to your nearest Urgent Care or Emergency Department is indicated.  Take this paper with you if you go.     - If you develop an allergic reaction to the medications that were given during your procedure such as difficulty breathing, rash, hives, severe nausea, vomiting or lightheadedness.  - If you experience chest pain, shortness of breath, severe abdominal pain, fevers and chills.  -If you develop signs and symptoms of bleeding such as blood in your spit, if your stools turn black, tarry, or bloody  - If you have not urinated within 8 hours following your procedure.  - If your IV site becomes painful, red, inflamed, or looks infected.    If you received a biopsy/polypectomy/sphincterotomy the following instructions apply below:    _x_ Do not use Aspirin containing products, non-steroidal medications or anti-coagulants for one week following your procedure. (Examples of these types of medications are: Advil,  Arthrotec, Aleve, Coumadin, Ecotrin, Heparin, Ibuprofen, Indocin, Motrin, Naprosyn, Nuprin, Plavix, Vioxx, and Voltarin, or their generic forms.  This list is not all-inclusive.  Check with your physician or pharmacist before resuming medications.)   __ Eat a soft diet today.  Avoid foods that are poorly digested for the next 24 hours.  These foods would include: nuts, beans, lettuce, red meats, and fried foods. Start with liquids and advance your diet as tolerated, gradually work up to eating solids.   __ Do not have a Barium Study or Enema for one week.    Your physician recommends the additional following instructions:    -You have a contact number available for emergencies. The signs and symptoms of potential delayed complications were discussed with you. You may return to normal activities tomorrow.  -Resume your previous diet.  -Continue your present medications.   -We are waiting for your pathology results.  -Your physician has recommended a repeat colonoscopy (date to be determined after pending pathology results are reviewed) for surveillance based on pathology results.  -The findings and recommendations have been discussed with you.  -The findings and recommendations were discussed with your family.  - Please see Medication Reconciliation Form for new medication/medications prescribed.       If you experience any problems or have any questions following discharge from the GI Lab, please call: Dr. Tovar [phone]

## 2024-07-19 NOTE — ANESTHESIA PREPROCEDURE EVALUATION
Patient: Trinh Vences    Procedure Information       Date/Time: 07/19/24 0930    Scheduled providers: Dung Tovar MD    Procedure: COLONOSCOPY    Location: [facility]            Relevant Problems   Anesthesia (within normal limits)      Cardiac (within normal limits)   (+) Atypical chest pain (Resolved)   (+) Breast pain (Resolved)   (+) Hyperlipidemia (Resolved)   (+) PVC (premature ventricular contraction) (Resolved)      Pulmonary (within normal limits)      Neuro   (+) NARCISO (generalized anxiety disorder)   (+) Peripheral neuropathy   (+) Recurrent major depressive disorder (CMS-HCC)      GI   (+) Cancer of appendix (Multi)   (+) Chronic GERD      /Renal   (+) Acute UTI      Liver   (+) Cancer of appendix (Multi)      Endocrine (within normal limits)      Hematology   (+) Right leg DVT (Multi)      ID   (+) Acute UTI       Clinical information reviewed:   Tobacco  Allergies  Meds   Med Hx  Surg Hx  OB Status  Fam Hx  Soc   Hx        NPO Detail:  NPO/Void Status  Date of Last Liquid: 07/19/24  Time of Last Liquid: 0630  Date of Last Solid: 07/17/24  Last Intake Type: Clear fluids         Physical Exam    Airway  Mallampati: II  TM distance: >3 FB  Neck ROM: full     Cardiovascular   Rhythm: regular  Rate: normal     Dental    Pulmonary - normal exam     Abdominal            Anesthesia Plan    History of general anesthesia?: yes  History of complications of general anesthesia?: no    ASA 3     MAC     The patient is not a current smoker.    intravenous induction   Anesthetic plan and risks discussed with patient.

## 2024-07-19 NOTE — ANESTHESIA POSTPROCEDURE EVALUATION
Patient: Trinh Vences    Procedure Summary       Date: 07/19/24 Room / Location: [facility]    Anesthesia Start: 0932 Anesthesia Stop: 0952    Procedure: COLONOSCOPY Diagnosis:       Cancer of appendix (Multi)      Cancer of appendix (Multi)    Scheduled Providers: Dung Tovar MD Responsible Provider: ALTAGRACIA Campuzano    Anesthesia Type: MAC ASA Status: 3            Anesthesia Type: MAC    Vitals Value Taken Time   /69 07/19/24 1010   Temp 36.2 07/19/24 1055   Pulse 74 07/19/24 1010   Resp 13 07/19/24 1010   SpO2 98 % 07/19/24 1010       Anesthesia Post Evaluation    Patient location during evaluation: PACU  Patient participation: complete - patient participated  Level of consciousness: awake and alert  Pain score: 0  Pain management: adequate  Airway patency: patent  Cardiovascular status: acceptable  Respiratory status: acceptable  Hydration status: acceptable  Postoperative Nausea and Vomiting: none        No notable events documented.     Walk in Private Auto

## 2024-07-30 LAB
LABORATORY COMMENT REPORT: NORMAL
PATH REPORT.FINAL DX SPEC: NORMAL
PATH REPORT.GROSS SPEC: NORMAL
PATH REPORT.TOTAL CANCER: NORMAL

## 2024-09-03 ENCOUNTER — LAB (OUTPATIENT)
Dept: LAB | Facility: LAB | Age: 79
End: 2024-09-03
Payer: MEDICARE

## 2024-09-03 DIAGNOSIS — D37.3 NEOPLASM OF UNCERTAIN BEHAVIOR OF APPENDIX: Primary | ICD-10-CM

## 2024-09-03 DIAGNOSIS — C18.1 CANCER OF APPENDIX (MULTI): ICD-10-CM

## 2024-09-03 LAB
CEA SERPL-MCNC: 2.9 UG/L
CREAT SERPL-MCNC: 0.56 MG/DL (ref 0.5–1.05)
EGFRCR SERPLBLD CKD-EPI 2021: >90 ML/MIN/1.73M*2

## 2024-09-03 PROCEDURE — 82565 ASSAY OF CREATININE: CPT

## 2024-09-03 PROCEDURE — 36415 COLL VENOUS BLD VENIPUNCTURE: CPT

## 2024-09-03 PROCEDURE — 82378 CARCINOEMBRYONIC ANTIGEN: CPT

## 2024-09-05 ENCOUNTER — HOSPITAL ENCOUNTER (OUTPATIENT)
Dept: RADIOLOGY | Facility: HOSPITAL | Age: 79
Discharge: HOME | End: 2024-09-05
Payer: MEDICARE

## 2024-09-05 DIAGNOSIS — D37.3 NEOPLASM OF UNCERTAIN BEHAVIOR OF APPENDIX: ICD-10-CM

## 2024-09-05 PROCEDURE — 74177 CT ABD & PELVIS W/CONTRAST: CPT

## 2024-09-05 PROCEDURE — 2550000001 HC RX 255 CONTRASTS: Performed by: SURGERY

## 2024-09-11 ENCOUNTER — TELEPHONE (OUTPATIENT)
Dept: SURGICAL ONCOLOGY | Facility: CLINIC | Age: 79
End: 2024-09-11
Payer: MEDICARE

## 2024-09-11 NOTE — TELEPHONE ENCOUNTER
Trinh called for results of her recent CT and CEA level. I informed her that Dr. Fernandez did review an he said the scan looks good, CEA is down from a high of 3.1 now 2.9, colonoscopy was negative. She's 10 years out from surgery. we can stop surveillance at this time. She know to call if she develops any symptoms such as abdominal pain or fullness.

## 2024-10-04 ENCOUNTER — TELEPHONE (OUTPATIENT)
Dept: PRIMARY CARE | Facility: CLINIC | Age: 79
End: 2024-10-04
Payer: MEDICARE

## 2024-10-04 NOTE — TELEPHONE ENCOUNTER
Patient called stating that she has been steffanie fosamax for 2 months and has been experiencing hip and groin pain and she is debating whether or not she should continue with the med. Please advise

## 2024-10-08 ENCOUNTER — OFFICE VISIT (OUTPATIENT)
Dept: PRIMARY CARE | Facility: CLINIC | Age: 79
End: 2024-10-08
Payer: MEDICARE

## 2024-10-08 ENCOUNTER — APPOINTMENT (OUTPATIENT)
Dept: NEUROLOGY | Facility: CLINIC | Age: 79
End: 2024-10-08
Payer: MEDICARE

## 2024-10-08 VITALS
BODY MASS INDEX: 25.02 KG/M2 | DIASTOLIC BLOOD PRESSURE: 64 MMHG | SYSTOLIC BLOOD PRESSURE: 130 MMHG | OXYGEN SATURATION: 95 % | HEART RATE: 86 BPM | TEMPERATURE: 97 F | WEIGHT: 155 LBS

## 2024-10-08 VITALS
DIASTOLIC BLOOD PRESSURE: 60 MMHG | TEMPERATURE: 97.3 F | SYSTOLIC BLOOD PRESSURE: 112 MMHG | HEART RATE: 70 BPM | BODY MASS INDEX: 24.98 KG/M2 | HEIGHT: 66 IN | WEIGHT: 155.4 LBS | RESPIRATION RATE: 18 BRPM

## 2024-10-08 DIAGNOSIS — R10.31 GROIN PAIN, RIGHT: ICD-10-CM

## 2024-10-08 DIAGNOSIS — G62.9 PERIPHERAL POLYNEUROPATHY: Primary | ICD-10-CM

## 2024-10-08 DIAGNOSIS — M70.61 GREATER TROCHANTERIC BURSITIS, RIGHT: Primary | ICD-10-CM

## 2024-10-08 PROCEDURE — 99214 OFFICE O/P EST MOD 30 MIN: CPT | Performed by: PSYCHIATRY & NEUROLOGY

## 2024-10-08 PROCEDURE — 1036F TOBACCO NON-USER: CPT | Performed by: PSYCHIATRY & NEUROLOGY

## 2024-10-08 PROCEDURE — 1159F MED LIST DOCD IN RCRD: CPT | Performed by: PSYCHIATRY & NEUROLOGY

## 2024-10-08 PROCEDURE — 99213 OFFICE O/P EST LOW 20 MIN: CPT | Performed by: INTERNAL MEDICINE

## 2024-10-08 PROCEDURE — 1126F AMNT PAIN NOTED NONE PRSNT: CPT | Performed by: PSYCHIATRY & NEUROLOGY

## 2024-10-08 PROCEDURE — 1036F TOBACCO NON-USER: CPT | Performed by: INTERNAL MEDICINE

## 2024-10-08 PROCEDURE — 1160F RVW MEDS BY RX/DR IN RCRD: CPT | Performed by: PSYCHIATRY & NEUROLOGY

## 2024-10-08 RX ORDER — ETODOLAC 500 MG/1
500 TABLET, FILM COATED ORAL 2 TIMES DAILY
Qty: 60 TABLET | Refills: 0 | Status: SHIPPED | OUTPATIENT
Start: 2024-10-08 | End: 2025-10-08

## 2024-10-08 ASSESSMENT — PATIENT HEALTH QUESTIONNAIRE - PHQ9
2. FEELING DOWN, DEPRESSED OR HOPELESS: NOT AT ALL
SUM OF ALL RESPONSES TO PHQ9 QUESTIONS 1 AND 2: 0
SUM OF ALL RESPONSES TO PHQ9 QUESTIONS 1 AND 2: 1
2. FEELING DOWN, DEPRESSED OR HOPELESS: SEVERAL DAYS
1. LITTLE INTEREST OR PLEASURE IN DOING THINGS: NOT AT ALL
10. IF YOU CHECKED OFF ANY PROBLEMS, HOW DIFFICULT HAVE THESE PROBLEMS MADE IT FOR YOU TO DO YOUR WORK, TAKE CARE OF THINGS AT HOME, OR GET ALONG WITH OTHER PEOPLE: NOT DIFFICULT AT ALL
1. LITTLE INTEREST OR PLEASURE IN DOING THINGS: NOT AT ALL

## 2024-10-08 ASSESSMENT — ENCOUNTER SYMPTOMS
HALLUCINATIONS: 0
HEADACHES: 0
FEVER: 0
EYE PAIN: 0

## 2024-10-08 ASSESSMENT — PAIN SCALES - GENERAL: PAINLEVEL: 0-NO PAIN

## 2024-10-08 NOTE — PROGRESS NOTES
Subjective     Trinh Vences is a 78 y.o. year old female here as follow up for neuropathy.     HPI  Numbness in feet over 6-7 years.  B/l, equally.  No burning pain, no electric pains. No major injuries.  No skin issues.   Started in toes.  Not progressive. No weakness.   Walking is okay.   Balance is not as good.  She is doing well. She goes on hikes.   She has hx of DVT, low back pain.   B12 620, TSH WNL.    No FH of neuropathy.  12/1/2023 emg was not in chart but was found / faxed over from cardiovascular dept from [place] and Emg showed chronic L3/L4 radiculopathy without active denervation.     Review of Systems   Constitutional:  Negative for fever.   HENT:  Negative for ear pain.    Eyes:  Negative for pain.   Neurological:  Negative for syncope and headaches.   Psychiatric/Behavioral:  Negative for hallucinations and self-injury.    All other systems reviewed and are negative.      Patient Active Problem List   Diagnosis    Abnormal mammogram    Abnormal ultrasound of breast    Acute bronchitis    Acute left lower quadrant pain    Acute UTI    Axillary adenopathy    Back pain    Low back pain, episodic    Chronic GERD    Elevated blood sugar level    Fibrocystic changes of left breast    Fibrocystic changes of right breast    Frequency of micturition    Heel pain    History of depression    Intraductal papilloma of left breast    Left flank pain    Left groin pain    Cancer of appendix (Multi)    Neuralgia    Peripheral neuropathy    Plantar fasciitis, left    Presence of IVC filter    Recurrent major depressive disorder (CMS-HCC)    Right ankle swelling    Right leg DVT (Multi)    Tiredness    NARCISO (generalized anxiety disorder)    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     Past Medical History:   Diagnosis Date    Anxiety     Anxiety disorder, unspecified     Anxiety    Cystocele, unspecified (CODE) 01/03/2017    Vaginal prolapse    Depression, unspecified     Depressive disorder    Personal history of  diseases of the skin and subcutaneous tissue     History of actinic keratosis    Personal history of malignant neoplasm of other digestive organs 05/06/2020    History of malignant neoplasm of appendix    Personal history of other diseases of the digestive system     History of gastroesophageal reflux (GERD)    Personal history of other diseases of the musculoskeletal system and connective tissue     History of osteopenia    Personal history of other malignant neoplasm of skin     History of basal cell carcinoma    Personal history of other medical treatment     History of screening mammography    Personal history of other specified conditions 06/07/2019    History of urinary frequency    Personal history of other specified conditions     History of atypical nevus    Unilateral primary osteoarthritis, right knee     Primary osteoarthritis of right knee     Past Surgical History:   Procedure Laterality Date    BREAST BIOPSY Left     LT exc bx 2018    CHOLECYSTECTOMY  09/29/2014    Cholecystectomy    OTHER SURGICAL HISTORY  09/29/2014    Salpingo-oophorectomy    OTHER SURGICAL HISTORY  09/29/2014    Right Hemicolectomy    OTHER SURGICAL HISTORY  09/29/2014    Resection Of Omentum     Social History     Tobacco Use    Smoking status: Never    Smokeless tobacco: Never   Substance Use Topics    Alcohol use: Not Currently     Comment: rare     family history includes Breast cancer in her mother and mother's sister.    Current Outpatient Medications:     alendronate (Fosamax) 70 mg tablet, Take 1 tablet (70 mg) by mouth every 7 days. Take in the morning with a full glass of water, on an empty stomach, and do not take anything else by mouth or lie down for the next 30 min., Disp: 13 tablet, Rfl: 3    aspirin 81 mg EC tablet, Take 1 tablet (81 mg) by mouth once daily., Disp: , Rfl:     calcium carbonate (Tums) 200 mg calcium chewable tablet, Chew., Disp: , Rfl:     cholecalciferol, vitamin D3, 250 mcg (10,000 unit) capsule,  Take by mouth., Disp: , Rfl:     cyanocobalamin (Vitamin B-12) 1,000 mcg tablet, Take 1 tablet (1,000 mcg) by mouth once daily., Disp: , Rfl:     docosahexaenoic acid/epa (FISH OIL ORAL), Take 1,000 mg by mouth once daily., Disp: , Rfl:     estradiol (Estrace) 0.1 MG/GM vaginal cream, , Disp: , Rfl:     etodolac (Lodine) 500 mg tablet, Take 1 tablet (500 mg) by mouth 2 times a day. For 2 weeks then as needed, Disp: 60 tablet, Rfl: 0    MAGNESIUM GLYCINATE ORAL, Take 1 tablet by mouth once daily., Disp: , Rfl:     methocarbamol (Robaxin) 500 mg tablet, Take 1 tablet (500 mg) by mouth 4 times a day as needed for muscle spasms., Disp: 40 tablet, Rfl: 0    mirabegron (Myrbetriq) 25 mg tablet extended release 24 hr 24 hr tablet, Take 1 tablet (25 mg) by mouth once daily., Disp: , Rfl:     omega 3-dha-epa-fish oil (Fish OiL) 1,000 mg (120 mg-180 mg) capsule, Take by mouth., Disp: , Rfl:     omeprazole (PriLOSEC) 20 mg DR capsule, Take 1 capsule (20 mg) by mouth once daily in the morning. Take before meals., Disp: 90 capsule, Rfl: 3    sodium,potassium,mag sulfates (Suprep) 17.5-3.13-1.6 gram recon soln solution, Take one bottle twice as directed by the prep instructions, Disp: 354 mL, Rfl: 0    traZODone (Desyrel) 50 mg tablet, Take 1 tablet (50 mg) by mouth once daily at bedtime., Disp: 90 tablet, Rfl: 3  Allergies   Allergen Reactions    Ciprofloxacin Other     Tendon and ankle pain    Simvastatin Other     fatigue     There were no vitals taken for this visit.  Neurological Exam/Physical Exam:    Constitutional: General appearance: no acute distress. Pleasant.   Auscultation of Heart: Regular rate and rhythm, no murmurs, normal S1 and S2.   Carotid Arteries: no bruits  Peripheral Vascular Exam: No swelling, edema or tenderness to palpation in extremities  Mental status: no distress, alert, interactive and cooperative. Affect is appropriate.   Orientation: oriented to person, oriented to place and oriented to time.    Memory: recent memory intact and remote memory intact.   Attention: normal attention /concentration.   Language: normal comprehension and naming.   Fund of knowledge: Patient displays adequate knowledge of current events.  Eyes: The ophthalmoscopic examination was normal.   Cranial nerve II: Visual fields full to confrontation.   Cranial nerves III, IV, and VI: Pupils round, equally reactive to light; EOMs intact. No nystagmus.   Cranial Nerve V: Facial sensation intact to LT bilaterally.   Cranial nerve VII: no facial droop  Cranial nerve VIII: Hearing is intact  Cranial nerves IX and X: Palate elevates symmetrically.   Cranial nerve XI: Shoulder shrug intact.   Cranial nerve XII: Tongue is midline.  Motor:  Muscle bulk was normal in both upper and lower extremities.    No atrophy.   Strength is normal.   Deep Tendon Reflexes: left biceps 2+ , right biceps 2+, left triceps 2+, right triceps 2+, left brachioradialis 2+, right brachioradialis 2+, left patella 2+, right patella 2+, left ankle jerk 2+, right ankle jerk 2+   Plantar Reflex: Toes downgoing to plantar stimulation on the left. Toes downgoing to plantar stimulation on the right.   Sensory Exam: dec vibratory sensation b/l  feet but normal in ankles b/l.   Coordination:  no limb dystaxia and rapid alternating movements are intact.   Gait:  cautious.  Romberg present.        Labs:  CBC:   Lab Results   Component Value Date    WBC 10.2 06/12/2024    HGB 16.3 (H) 06/12/2024    HCT 48.8 (H) 06/12/2024     06/12/2024     BMP:   Lab Results   Component Value Date     06/12/2024    K 4.5 06/12/2024     06/12/2024    CO2 31 06/12/2024    BUN 17 06/12/2024    CREATININE 0.56 09/03/2024    CALCIUM 10.2 06/12/2024    MG 2.31 06/12/2024    PHOS 3.4 12/05/2019     LFT:   Lab Results   Component Value Date    ALKPHOS 77 06/12/2024    BILITOT 0.7 06/12/2024    BILIDIR 0.1 06/12/2024    PROT 6.9 06/12/2024    ALBUMIN 4.5 06/12/2024    ALT 20 06/12/2024     AST 17 06/12/2024       Assessment/Plan   Problem List Items Addressed This Visit    None    Lumbar radiculopathy- observation.

## 2024-10-08 NOTE — PROGRESS NOTES
My nurse note reviewed. Patient is here for:  Follow-up (Hip pain/And groin)     Does hiking .   Has been having hip pain on R hip . Going on for few days   No fall or injury . No radiation .   She thinks it is from her fosamax.   OBJECTIVE :  /64   Pulse 86   Temp 36.1 °C (97 °F)   Wt 70.3 kg (155 lb)   SpO2 95%   BMI 25.02 kg/m²   Both hip movements ok with abd and add movements.   No groin pain . R femoral pulse ok   Local tenderness in R gr trachanteric area , reproducible and similar to her pain .   Slr ok upto 70 deg.   Assessment:  1. Greater trochanteric bursitis, right  etodolac (Lodine) 500 mg tablet      2. Groin pain, right          Plan told that most likely her symptoms are not due to fosamax  Will try anti inflamotory medication and see how she does.   Patient  was advised to call back if symptoms persist after few days or worsen.   Patient agreed with plan and felt satisfied.  Follow up as scheduled  or as needed.

## 2024-10-08 NOTE — PATIENT INSTRUCTIONS
"It was a pleasure seeing you today.     Please make a follow up appointment as needed.    For any urgent issues or needing to speak to a medical assistant please call [phone], option 6 during our office hours Monday-Friday 8am-4pm, and leave a voicemail with your concern.  My office will try to reach back you as soon as possible within 24 (business) hours.  If you have an emergency please call 911 or visit a local urgent care or nearest emergency room.      Please understand that [employer] is a useful communication tool for simple \"normal\" results or a refill request but I would not recommend using this tool for emergent or urgent issues or for conversations with me.  I am happy to ask my staff to rearrange a follow up visit or a virtual visit sooner than requested if appropriate for your care.    "

## 2024-10-08 NOTE — PATIENT INSTRUCTIONS
Plan told that most likely her symptoms are not due to fosamax  Will try anti inflamotory medication and see how she does.   Patient  was advised to call back if symptoms persist after few days or worsen.   Patient agreed with plan and felt satisfied.  Follow up as scheduled  or as needed.

## 2024-12-09 ENCOUNTER — APPOINTMENT (OUTPATIENT)
Dept: PRIMARY CARE | Facility: CLINIC | Age: 79
End: 2024-12-09
Payer: MEDICARE

## 2024-12-09 VITALS
WEIGHT: 150 LBS | DIASTOLIC BLOOD PRESSURE: 64 MMHG | SYSTOLIC BLOOD PRESSURE: 128 MMHG | OXYGEN SATURATION: 96 % | BODY MASS INDEX: 24.21 KG/M2

## 2024-12-09 DIAGNOSIS — N32.81 OVERACTIVE BLADDER: ICD-10-CM

## 2024-12-09 DIAGNOSIS — F33.1 MODERATE EPISODE OF RECURRENT MAJOR DEPRESSIVE DISORDER: ICD-10-CM

## 2024-12-09 DIAGNOSIS — R73.03 PREDIABETES: Primary | ICD-10-CM

## 2024-12-09 DIAGNOSIS — M81.0 AGE-RELATED OSTEOPOROSIS WITHOUT CURRENT PATHOLOGICAL FRACTURE: ICD-10-CM

## 2024-12-09 PROCEDURE — 99214 OFFICE O/P EST MOD 30 MIN: CPT | Performed by: INTERNAL MEDICINE

## 2024-12-09 PROCEDURE — G2211 COMPLEX E/M VISIT ADD ON: HCPCS | Performed by: INTERNAL MEDICINE

## 2024-12-09 PROCEDURE — 1036F TOBACCO NON-USER: CPT | Performed by: INTERNAL MEDICINE

## 2024-12-09 PROCEDURE — 1159F MED LIST DOCD IN RCRD: CPT | Performed by: INTERNAL MEDICINE

## 2024-12-09 RX ORDER — ALENDRONATE SODIUM 70 MG/1
70 TABLET ORAL
Qty: 13 TABLET | Refills: 3 | Status: SHIPPED | OUTPATIENT
Start: 2024-12-09 | End: 2025-12-09

## 2024-12-09 NOTE — PROGRESS NOTES
My nurse note reviewed. Patient is here for:  Follow-up (Ankle. Problem has been present for a couple of days/)       Patient denies any shortness of breath, PND, orthopnea, chest pain , palpitation, syncope or edema in legs  Patient denies any weakness in extremities.. Denies any headache, visual symptoms , speech problems or  tremors . No TIA or stroke like symptoms..    Has some ankle problem in R ankle , wants to be checked    Depression symptoms are ok with med.     OBJECTIVE :  /64   Wt 68 kg (150 lb)   SpO2 96%   BMI 24.21 kg/m²   CVS: S1 S2 + , no S3. No loud heart murmur appreciated. Lungs clear, No edema  CNS: Patient is alert, oriented moving all 4 extremities well. No motor weakness noted on gross neurological exam. No involuntary movements or tremors noted.    Assessment:  1. Prediabetes  Hx of. controlled  Lab Results   Component Value Date    HGBA1C 5.5 06/12/2024           2. Age-related osteoporosis without current pathological fracture -reill done alendronate (Fosamax) 70 mg tablet      3. Moderate episode of recurrent major depressive disorder  On meds. Doing ok       4. Overactive bladder  Sees urology. On meds doing ok       During office visit today patient's chronic diagnosis were reviewed and questions related to it answered to patient's satisfaction . Risk factors for heart, stroke were discussed with patient . Discussion about preventative tests were done with patient also . pain issue was discussed as appropriate for patient . I plan to follow up patient's chronic medical conditions as appropriate.     Plan  Current medications are effective. advised to continue current medications.  Requested prescription/s refill done to the pharmacy of patient choice .  Going to [place] for few months   F/U in June for Medicare wellness.

## 2024-12-09 NOTE — PATIENT INSTRUCTIONS
Plan  Current medications are effective. advised to continue current medications.  Requested prescription/s refill done to the pharmacy of patient choice .  Going to [place] for few months   F/U in June for Medicare wellness.

## 2024-12-13 ENCOUNTER — TELEPHONE (OUTPATIENT)
Dept: SURGERY | Facility: CLINIC | Age: 79
End: 2024-12-13
Payer: MEDICARE

## 2024-12-13 DIAGNOSIS — M62.838 MUSCLE SPASM: Primary | ICD-10-CM

## 2024-12-13 RX ORDER — METHOCARBAMOL 500 MG/1
500 TABLET, FILM COATED ORAL 4 TIMES DAILY
COMMUNITY
End: 2024-12-13 | Stop reason: SDUPTHER

## 2024-12-13 NOTE — TELEPHONE ENCOUNTER
Patient was just in Monday for 6 mon follow up just stared having back spasms last night. Has had this happen tin the past and Dr. Angela has prescribed something for rhis in the past, want to know if he can prescribe something again

## 2024-12-13 NOTE — TELEPHONE ENCOUNTER
"Hi!  This patient is set up for a mammogram on 12/16/24.  On the appointment desk it states that the appointment is \"scheduled\", but there's also a message stating a \"reschedule\" is needed.  Please advise.  The patient would like a call back to confirm the appointment on Monday.  Thanks!  "

## 2024-12-14 RX ORDER — METHOCARBAMOL 500 MG/1
500 TABLET, FILM COATED ORAL 4 TIMES DAILY
Qty: 90 TABLET | Refills: 3 | Status: SHIPPED | OUTPATIENT
Start: 2024-12-14

## 2024-12-16 ENCOUNTER — HOSPITAL ENCOUNTER (OUTPATIENT)
Dept: RADIOLOGY | Facility: CLINIC | Age: 79
Discharge: HOME | End: 2024-12-16
Payer: MEDICARE

## 2024-12-16 VITALS — WEIGHT: 155 LBS | BODY MASS INDEX: 24.91 KG/M2 | HEIGHT: 66 IN

## 2024-12-16 DIAGNOSIS — Z12.31 SCREENING MAMMOGRAM FOR BREAST CANCER: ICD-10-CM

## 2024-12-16 PROCEDURE — 77067 SCR MAMMO BI INCL CAD: CPT

## 2024-12-16 PROCEDURE — 77063 BREAST TOMOSYNTHESIS BI: CPT | Performed by: RADIOLOGY

## 2024-12-16 PROCEDURE — 77067 SCR MAMMO BI INCL CAD: CPT | Performed by: RADIOLOGY

## 2024-12-20 ENCOUNTER — OFFICE VISIT (OUTPATIENT)
Dept: SURGERY | Facility: CLINIC | Age: 79
End: 2024-12-20
Payer: MEDICARE

## 2024-12-20 VITALS
SYSTOLIC BLOOD PRESSURE: 145 MMHG | HEART RATE: 80 BPM | BODY MASS INDEX: 24.91 KG/M2 | WEIGHT: 155 LBS | TEMPERATURE: 98.2 F | HEIGHT: 66 IN | OXYGEN SATURATION: 96 % | RESPIRATION RATE: 18 BRPM | DIASTOLIC BLOOD PRESSURE: 70 MMHG

## 2024-12-20 DIAGNOSIS — D24.2 INTRADUCTAL PAPILLOMA OF LEFT BREAST: Primary | ICD-10-CM

## 2024-12-20 PROCEDURE — 1036F TOBACCO NON-USER: CPT | Performed by: SURGERY

## 2024-12-20 PROCEDURE — 99213 OFFICE O/P EST LOW 20 MIN: CPT | Performed by: SURGERY

## 2024-12-20 PROCEDURE — 1126F AMNT PAIN NOTED NONE PRSNT: CPT | Performed by: SURGERY

## 2024-12-20 PROCEDURE — 1159F MED LIST DOCD IN RCRD: CPT | Performed by: SURGERY

## 2024-12-20 SDOH — ECONOMIC STABILITY: FOOD INSECURITY: WITHIN THE PAST 12 MONTHS, YOU WORRIED THAT YOUR FOOD WOULD RUN OUT BEFORE YOU GOT MONEY TO BUY MORE.: NEVER TRUE

## 2024-12-20 SDOH — ECONOMIC STABILITY: FOOD INSECURITY: WITHIN THE PAST 12 MONTHS, THE FOOD YOU BOUGHT JUST DIDN'T LAST AND YOU DIDN'T HAVE MONEY TO GET MORE.: NEVER TRUE

## 2024-12-20 ASSESSMENT — PAIN SCALES - GENERAL: PAINLEVEL_OUTOF10: 0-NO PAIN

## 2024-12-20 ASSESSMENT — LIFESTYLE VARIABLES
HOW MANY STANDARD DRINKS CONTAINING ALCOHOL DO YOU HAVE ON A TYPICAL DAY: 1 OR 2
HOW OFTEN DO YOU HAVE SIX OR MORE DRINKS ON ONE OCCASION: NEVER
HOW OFTEN DO YOU HAVE A DRINK CONTAINING ALCOHOL: MONTHLY OR LESS
AUDIT-C TOTAL SCORE: 1
SKIP TO QUESTIONS 9-10: 1

## 2024-12-20 ASSESSMENT — ENCOUNTER SYMPTOMS
OCCASIONAL FEELINGS OF UNSTEADINESS: 1
LOSS OF SENSATION IN FEET: 1
DEPRESSION: 0

## 2024-12-20 ASSESSMENT — COLUMBIA-SUICIDE SEVERITY RATING SCALE - C-SSRS
2. HAVE YOU ACTUALLY HAD ANY THOUGHTS OF KILLING YOURSELF?: NO
1. IN THE PAST MONTH, HAVE YOU WISHED YOU WERE DEAD OR WISHED YOU COULD GO TO SLEEP AND NOT WAKE UP?: NO
6. HAVE YOU EVER DONE ANYTHING, STARTED TO DO ANYTHING, OR PREPARED TO DO ANYTHING TO END YOUR LIFE?: NO

## 2024-12-20 ASSESSMENT — PATIENT HEALTH QUESTIONNAIRE - PHQ9
2. FEELING DOWN, DEPRESSED OR HOPELESS: NOT AT ALL
SUM OF ALL RESPONSES TO PHQ9 QUESTIONS 1 & 2: 0
1. LITTLE INTEREST OR PLEASURE IN DOING THINGS: NOT AT ALL

## 2024-12-20 NOTE — PROGRESS NOTES
History Of Present Illness  HPI    The patient is an 79-year-old female who had a left breast needle localization excisional biopsy for an intraductal papilloma on June 7, 2018.  Age of menarche was 9.  G5, P5.  Age of first childbirth was 25.  Last menstrual period in 1995.  The patient follows up after having bilateral mammogram.  No breast lumps or breast pain.  No nipple discharge or retraction.    Family history: Mother had metastatic breast cancer.  Maternal aunt and maternal cousin had breast cancer.     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  Right lower extremity DVT requiring thrombectomy and IVC filter placement.  Depression for which she takes Zoloft  Neuropathy     Past Medical History  She has a past medical history of Anxiety, Anxiety disorder, unspecified, Cystocele, unspecified (CODE) (01/03/2017), Depression, unspecified, Personal history of diseases of the skin and subcutaneous tissue, Personal history of malignant neoplasm of other digestive organs (05/06/2020), Personal history of other diseases of the digestive system, Personal history of other diseases of the musculoskeletal system and connective tissue, Personal history of other malignant neoplasm of skin, Personal history of other medical treatment, Personal history of other specified conditions (06/07/2019), Personal history of other specified conditions, and Unilateral primary osteoarthritis, right knee.    Surgical History  She has a past surgical history that includes Other surgical history (09/29/2014); Other surgical history (09/29/2014); Other surgical history (09/29/2014); Cholecystectomy (09/29/2014); and Breast biopsy (Left).     Allergies  Ciprofloxacin and Simvastatin    Social History  She reports that she has never smoked. She has never used smokeless tobacco. She reports that she does not currently use alcohol. She reports that she does not use drugs.    Family  "History  Family History   Problem Relation Name Age of Onset    Breast cancer Mother      Breast cancer Mother's Sister         Last Recorded Vitals  Blood pressure 145/70, pulse 80, temperature 36.8 °C (98.2 °F), resp. rate 18, height 1.676 m (5' 6\"), weight 70.3 kg (155 lb), SpO2 96%.    Physical Exam  Constitutional: Well-developed, well-nourished, alert and oriented, no acute distress  Skin: Warm and dry, no lesions, no rashes, no jaundice  HEENT: Normocephalic, atraumatic, EOMI, no scleral icterus, mucous membranes moist, no lesions seen  Neck: Soft, nontender, no mass or adenopathy  Cardiac: Regular rate and rhythm, no murmur  Chest: Patent airway, clear to auscultation, normal breath sounds with good chest expansion, no wheezes or rales or rhonchi noted, thorax symmetric  Breast:     right breast: No skin changes, nontender, no mass, mild fibrocystic change    left breast: No skin changes, nontender, no mass, mild fibrocystic change  Abdomen: Nondistended, soft, nontender, no mass  Rectal: Not performed  Extremities: No injury, no lower extremity edema or calf tenderness  Lymphatic: No cervical or axillary adenopathy  Musculoskeletal: Range of motion intact, no joint swelling, normal strength  Neurological: Alert and oriented x3, intact sensory and motor function, no obvious focal neurologic abnormalities  Psychological: Appropriate mood and behavior  Examination chaperoned by Yolis    Relevant Results  I reviewed the bilateral mammogram report and images from December 16, 2024.  IMPRESSION:  No mammographic evidence of malignancy.  BI-RADS Category:  2 Benign.  Recommendation:  Annual Screening.  Recommended Date:  1 Year.  Laterality:  Bilateral.    Assessment/Plan   Diagnoses and all orders for this visit:  Intraductal papilloma of left breast  No mass on examination.  Mammogram benign.  Patient has strong family history of breast cancer and has a personal history of intraductal " papilloma.  Recommendations:  Breast self-examination monthly  Follow-up in 1 year with repeat bilateral mammogram.  The patient goes to [place] for the winter and she will follow-up in the spring 2026 when she returns.        Anthony Day MD

## 2024-12-20 NOTE — LETTER
December 20, 2024     Gage Angela MD  [address]  [place] [address]    Patient: Trinh Vences   YOB: 1945   Date of Visit: 12/20/2024       Dear Dr. Gage Angela MD:    Thank you for referring Trinh Vences to me for evaluation. Below are my notes for this consultation.  If you have questions, please do not hesitate to call me. I look forward to following your patient along with you.       Sincerely,     Anthony Day MD      CC: No Recipients  ______________________________________________________________________________________    History Of Present Illness  HPI    The patient is an 79-year-old female who had a left breast needle localization excisional biopsy for an intraductal papilloma on June 7, 2018.  Age of menarche was 9.  G5, P5.  Age of first childbirth was 25.  Last menstrual period in 1995.  The patient follows up after having bilateral mammogram.  No breast lumps or breast pain.  No nipple discharge or retraction.    Family history: Mother had metastatic breast cancer.  Maternal aunt and maternal cousin had breast cancer.     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  Right lower extremity DVT requiring thrombectomy and IVC filter placement.  Depression for which she takes Zoloft  Neuropathy     Past Medical History  She has a past medical history of Anxiety, Anxiety disorder, unspecified, Cystocele, unspecified (CODE) (01/03/2017), Depression, unspecified, Personal history of diseases of the skin and subcutaneous tissue, Personal history of malignant neoplasm of other digestive organs (05/06/2020), Personal history of other diseases of the digestive system, Personal history of other diseases of the musculoskeletal system and connective tissue, Personal history of other malignant neoplasm of skin, Personal history of other medical treatment, Personal history of other specified conditions (06/07/2019),  "Personal history of other specified conditions, and Unilateral primary osteoarthritis, right knee.    Surgical History  She has a past surgical history that includes Other surgical history (09/29/2014); Other surgical history (09/29/2014); Other surgical history (09/29/2014); Cholecystectomy (09/29/2014); and Breast biopsy (Left).     Allergies  Ciprofloxacin and Simvastatin    Social History  She reports that she has never smoked. She has never used smokeless tobacco. She reports that she does not currently use alcohol. She reports that she does not use drugs.    Family History  Family History   Problem Relation Name Age of Onset   • Breast cancer Mother     • Breast cancer Mother's Sister         Last Recorded Vitals  Blood pressure 145/70, pulse 80, temperature 36.8 °C (98.2 °F), resp. rate 18, height 1.676 m (5' 6\"), weight 70.3 kg (155 lb), SpO2 96%.    Physical Exam  Constitutional: Well-developed, well-nourished, alert and oriented, no acute distress  Skin: Warm and dry, no lesions, no rashes, no jaundice  HEENT: Normocephalic, atraumatic, EOMI, no scleral icterus, mucous membranes moist, no lesions seen  Neck: Soft, nontender, no mass or adenopathy  Cardiac: Regular rate and rhythm, no murmur  Chest: Patent airway, clear to auscultation, normal breath sounds with good chest expansion, no wheezes or rales or rhonchi noted, thorax symmetric  Breast:     right breast: No skin changes, nontender, no mass, mild fibrocystic change    left breast: No skin changes, nontender, no mass, mild fibrocystic change  Abdomen: Nondistended, soft, nontender, no mass  Rectal: Not performed  Extremities: No injury, no lower extremity edema or calf tenderness  Lymphatic: No cervical or axillary adenopathy  Musculoskeletal: Range of motion intact, no joint swelling, normal strength  Neurological: Alert and oriented x3, intact sensory and motor function, no obvious focal neurologic abnormalities  Psychological: Appropriate mood " and behavior  Examination chaperoned by Yolis    Relevant Results  I reviewed the bilateral mammogram report and images from December 16, 2024.  IMPRESSION:  No mammographic evidence of malignancy.  BI-RADS Category:  2 Benign.  Recommendation:  Annual Screening.  Recommended Date:  1 Year.  Laterality:  Bilateral.    Assessment/Plan  Diagnoses and all orders for this visit:  Intraductal papilloma of left breast  No mass on examination.  Mammogram benign.  Patient has strong family history of breast cancer and has a personal history of intraductal papilloma.  Recommendations:  Breast self-examination monthly  Follow-up in 1 year with repeat bilateral mammogram.  The patient goes to [place] for the winter and she will follow-up in the spring 2026 when she returns.        Anthony Day MD

## 2024-12-27 ENCOUNTER — APPOINTMENT (OUTPATIENT)
Dept: SURGERY | Facility: CLINIC | Age: 79
End: 2024-12-27
Payer: MEDICARE

## 2025-02-11 DIAGNOSIS — F33.1 MODERATE EPISODE OF RECURRENT MAJOR DEPRESSIVE DISORDER: ICD-10-CM

## 2025-02-11 DIAGNOSIS — K21.9 CHRONIC GERD: ICD-10-CM

## 2025-02-12 RX ORDER — OMEPRAZOLE 20 MG/1
20 CAPSULE, DELAYED RELEASE ORAL
Qty: 100 CAPSULE | Refills: 3 | Status: SHIPPED | OUTPATIENT
Start: 2025-02-12

## 2025-02-12 RX ORDER — TRAZODONE HYDROCHLORIDE 50 MG/1
50 TABLET ORAL NIGHTLY
Qty: 90 TABLET | Refills: 3 | Status: SHIPPED | OUTPATIENT
Start: 2025-02-12

## 2025-04-30 ENCOUNTER — APPOINTMENT (OUTPATIENT)
Dept: RADIOLOGY | Facility: HOSPITAL | Age: 80
End: 2025-04-30
Payer: MEDICARE

## 2025-04-30 ENCOUNTER — HOSPITAL ENCOUNTER (INPATIENT)
Facility: HOSPITAL | Age: 80
LOS: 2 days | Discharge: NURSING, REHAB OR LONG-TERM CARE | End: 2025-05-03
Attending: EMERGENCY MEDICINE | Admitting: INTERNAL MEDICINE
Payer: MEDICARE
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
## 2025-04-30 DIAGNOSIS — S82.852A LEFT TRIMALLEOLAR FRACTURE, CLOSED, INITIAL ENCOUNTER: Primary | ICD-10-CM

## 2025-04-30 DIAGNOSIS — S82.852A CLOSED TRIMALLEOLAR FRACTURE OF LEFT ANKLE, INITIAL ENCOUNTER: ICD-10-CM

## 2025-04-30 PROCEDURE — 73610 X-RAY EXAM OF ANKLE: CPT | Mod: LT

## 2025-04-30 PROCEDURE — 99285 EMERGENCY DEPT VISIT HI MDM: CPT | Performed by: EMERGENCY MEDICINE

## 2025-05-01 PROBLEM — S82.852A LEFT TRIMALLEOLAR FRACTURE, CLOSED, INITIAL ENCOUNTER: Status: ACTIVE | Noted: 2025-05-01

## 2025-05-01 LAB
ABO GROUP (TYPE) IN BLOOD: NORMAL
ABO GROUP (TYPE) IN BLOOD: NORMAL
ALBUMIN SERPL BCP-MCNC: 4.2 G/DL (ref 3.4–5)
ALP SERPL-CCNC: 54 U/L (ref 33–136)
ALT SERPL W P-5'-P-CCNC: 16 U/L (ref 7–45)
ANION GAP SERPL CALC-SCNC: 13 MMOL/L (ref 10–20)
ANTIBODY SCREEN: NORMAL
APTT PPP: 25 SECONDS (ref 26–36)
AST SERPL W P-5'-P-CCNC: 15 U/L (ref 9–39)
BASOPHILS # BLD AUTO: 0.05 X10*3/UL (ref 0–0.1)
BASOPHILS NFR BLD AUTO: 0.3 %
BILIRUB SERPL-MCNC: 0.6 MG/DL (ref 0–1.2)
BUN SERPL-MCNC: 17 MG/DL (ref 6–23)
CALCIUM SERPL-MCNC: 9.3 MG/DL (ref 8.6–10.3)
CHLORIDE SERPL-SCNC: 105 MMOL/L (ref 98–107)
CO2 SERPL-SCNC: 25 MMOL/L (ref 21–32)
CREAT SERPL-MCNC: 0.67 MG/DL (ref 0.5–1.05)
EGFRCR SERPLBLD CKD-EPI 2021: 88 ML/MIN/1.73M*2
EOSINOPHIL # BLD AUTO: 0.18 X10*3/UL (ref 0–0.4)
EOSINOPHIL NFR BLD AUTO: 1.2 %
ERYTHROCYTE [DISTWIDTH] IN BLOOD BY AUTOMATED COUNT: 13.2 % (ref 11.5–14.5)
GLUCOSE SERPL-MCNC: 128 MG/DL (ref 74–99)
HCT VFR BLD AUTO: 46 % (ref 36–46)
HGB BLD-MCNC: 14.9 G/DL (ref 12–16)
IMM GRANULOCYTES # BLD AUTO: 0.05 X10*3/UL (ref 0–0.5)
IMM GRANULOCYTES NFR BLD AUTO: 0.3 % (ref 0–0.9)
INR PPP: 0.9 (ref 0.9–1.1)
LYMPHOCYTES # BLD AUTO: 2.27 X10*3/UL (ref 0.8–3)
LYMPHOCYTES NFR BLD AUTO: 15.3 %
MAGNESIUM SERPL-MCNC: 2.02 MG/DL (ref 1.6–2.4)
MCH RBC QN AUTO: 30.2 PG (ref 26–34)
MCHC RBC AUTO-ENTMCNC: 32.4 G/DL (ref 32–36)
MCV RBC AUTO: 93 FL (ref 80–100)
MONOCYTES # BLD AUTO: 1.04 X10*3/UL (ref 0.05–0.8)
MONOCYTES NFR BLD AUTO: 7 %
NEUTROPHILS # BLD AUTO: 11.26 X10*3/UL (ref 1.6–5.5)
NEUTROPHILS NFR BLD AUTO: 75.9 %
NRBC BLD-RTO: 0 /100 WBCS (ref 0–0)
PLATELET # BLD AUTO: 179 X10*3/UL (ref 150–450)
POTASSIUM SERPL-SCNC: 3.8 MMOL/L (ref 3.5–5.3)
PROT SERPL-MCNC: 6.7 G/DL (ref 6.4–8.2)
PROTHROMBIN TIME: 10.4 SECONDS (ref 9.8–12.4)
RBC # BLD AUTO: 4.93 X10*6/UL (ref 4–5.2)
RH FACTOR (ANTIGEN D): NORMAL
RH FACTOR (ANTIGEN D): NORMAL
SODIUM SERPL-SCNC: 139 MMOL/L (ref 136–145)
WBC # BLD AUTO: 14.9 X10*3/UL (ref 4.4–11.3)

## 2025-05-01 PROCEDURE — 86901 BLOOD TYPING SEROLOGIC RH(D): CPT | Performed by: NURSE PRACTITIONER

## 2025-05-01 PROCEDURE — 2500000001 HC RX 250 WO HCPCS SELF ADMINISTERED DRUGS (ALT 637 FOR MEDICARE OP): Performed by: INTERNAL MEDICINE

## 2025-05-01 PROCEDURE — 80053 COMPREHEN METABOLIC PANEL: CPT | Performed by: NURSE PRACTITIONER

## 2025-05-01 PROCEDURE — 99222 1ST HOSP IP/OBS MODERATE 55: CPT | Performed by: INTERNAL MEDICINE

## 2025-05-01 PROCEDURE — 36415 COLL VENOUS BLD VENIPUNCTURE: CPT | Performed by: NURSE PRACTITIONER

## 2025-05-01 PROCEDURE — 1100000001 HC PRIVATE ROOM DAILY

## 2025-05-01 PROCEDURE — 85025 COMPLETE CBC W/AUTO DIFF WBC: CPT | Performed by: NURSE PRACTITIONER

## 2025-05-01 PROCEDURE — 85610 PROTHROMBIN TIME: CPT | Performed by: NURSE PRACTITIONER

## 2025-05-01 PROCEDURE — 97161 PT EVAL LOW COMPLEX 20 MIN: CPT | Mod: GP

## 2025-05-01 PROCEDURE — 83735 ASSAY OF MAGNESIUM: CPT | Performed by: NURSE PRACTITIONER

## 2025-05-01 PROCEDURE — 73610 X-RAY EXAM OF ANKLE: CPT | Mod: LEFT SIDE | Performed by: STUDENT IN AN ORGANIZED HEALTH CARE EDUCATION/TRAINING PROGRAM

## 2025-05-01 PROCEDURE — 97530 THERAPEUTIC ACTIVITIES: CPT | Mod: GP

## 2025-05-01 PROCEDURE — 2500000001 HC RX 250 WO HCPCS SELF ADMINISTERED DRUGS (ALT 637 FOR MEDICARE OP): Performed by: NURSE PRACTITIONER

## 2025-05-01 RX ORDER — MORPHINE SULFATE 2 MG/ML
2 INJECTION, SOLUTION INTRAMUSCULAR; INTRAVENOUS EVERY 4 HOURS PRN
Status: DISCONTINUED | OUTPATIENT
Start: 2025-05-01 | End: 2025-05-03 | Stop reason: HOSPADM

## 2025-05-01 RX ORDER — PANTOPRAZOLE SODIUM 20 MG/1
20 TABLET, DELAYED RELEASE ORAL
Status: DISCONTINUED | OUTPATIENT
Start: 2025-05-01 | End: 2025-05-03 | Stop reason: HOSPADM

## 2025-05-01 RX ORDER — HYDRALAZINE HYDROCHLORIDE 20 MG/ML
5 INJECTION INTRAMUSCULAR; INTRAVENOUS EVERY 8 HOURS PRN
Status: DISCONTINUED | OUTPATIENT
Start: 2025-05-01 | End: 2025-05-03 | Stop reason: HOSPADM

## 2025-05-01 RX ORDER — TRAZODONE HYDROCHLORIDE 50 MG/1
50 TABLET ORAL NIGHTLY
Status: DISCONTINUED | OUTPATIENT
Start: 2025-05-01 | End: 2025-05-03 | Stop reason: HOSPADM

## 2025-05-01 RX ORDER — ACETAMINOPHEN 650 MG/1
650 SUPPOSITORY RECTAL EVERY 6 HOURS PRN
Status: DISCONTINUED | OUTPATIENT
Start: 2025-05-01 | End: 2025-05-01

## 2025-05-01 RX ORDER — ACETAMINOPHEN 160 MG/5ML
650 SOLUTION ORAL EVERY 6 HOURS PRN
Status: DISCONTINUED | OUTPATIENT
Start: 2025-05-01 | End: 2025-05-01

## 2025-05-01 RX ORDER — ASPIRIN 81 MG/1
81 TABLET ORAL DAILY
Status: DISCONTINUED | OUTPATIENT
Start: 2025-05-01 | End: 2025-05-01

## 2025-05-01 RX ORDER — MORPHINE SULFATE 2 MG/ML
1 INJECTION, SOLUTION INTRAMUSCULAR; INTRAVENOUS EVERY 4 HOURS PRN
Status: DISCONTINUED | OUTPATIENT
Start: 2025-05-01 | End: 2025-05-03 | Stop reason: HOSPADM

## 2025-05-01 RX ORDER — OXYCODONE HYDROCHLORIDE 5 MG/1
5 TABLET ORAL EVERY 6 HOURS PRN
Status: DISCONTINUED | OUTPATIENT
Start: 2025-05-01 | End: 2025-05-03 | Stop reason: HOSPADM

## 2025-05-01 RX ORDER — ACETAMINOPHEN 325 MG/1
650 TABLET ORAL EVERY 6 HOURS PRN
Status: DISCONTINUED | OUTPATIENT
Start: 2025-05-01 | End: 2025-05-03 | Stop reason: HOSPADM

## 2025-05-01 RX ORDER — IBUPROFEN 600 MG/1
600 TABLET ORAL ONCE
Status: COMPLETED | OUTPATIENT
Start: 2025-05-01 | End: 2025-05-01

## 2025-05-01 RX ORDER — POLYETHYLENE GLYCOL 3350 17 G/17G
17 POWDER, FOR SOLUTION ORAL DAILY PRN
Status: DISCONTINUED | OUTPATIENT
Start: 2025-05-01 | End: 2025-05-03 | Stop reason: HOSPADM

## 2025-05-01 RX ORDER — LANOLIN ALCOHOL/MO/W.PET/CERES
1000 CREAM (GRAM) TOPICAL DAILY
Status: DISCONTINUED | OUTPATIENT
Start: 2025-05-01 | End: 2025-05-03 | Stop reason: HOSPADM

## 2025-05-01 RX ORDER — TALC
3 POWDER (GRAM) TOPICAL NIGHTLY PRN
Status: DISCONTINUED | OUTPATIENT
Start: 2025-05-01 | End: 2025-05-03 | Stop reason: HOSPADM

## 2025-05-01 RX ORDER — OXYBUTYNIN CHLORIDE 5 MG/1
5 TABLET ORAL 2 TIMES DAILY
Status: DISCONTINUED | OUTPATIENT
Start: 2025-05-01 | End: 2025-05-03 | Stop reason: HOSPADM

## 2025-05-01 RX ORDER — ENOXAPARIN SODIUM 100 MG/ML
40 INJECTION SUBCUTANEOUS EVERY 24 HOURS
Status: DISCONTINUED | OUTPATIENT
Start: 2025-05-01 | End: 2025-05-01

## 2025-05-01 RX ORDER — ASPIRIN 81 MG/1
81 TABLET ORAL 2 TIMES DAILY
Status: DISCONTINUED | OUTPATIENT
Start: 2025-05-01 | End: 2025-05-03

## 2025-05-01 RX ADMIN — IBUPROFEN 600 MG: 600 TABLET ORAL at 01:19

## 2025-05-01 RX ADMIN — TRAZODONE HYDROCHLORIDE 50 MG: 50 TABLET ORAL at 20:50

## 2025-05-01 RX ADMIN — OXYCODONE HYDROCHLORIDE 5 MG: 5 TABLET ORAL at 17:37

## 2025-05-01 RX ADMIN — ASPIRIN 81 MG: 81 TABLET, COATED ORAL at 20:51

## 2025-05-01 RX ADMIN — ACETAMINOPHEN 650 MG: 325 TABLET ORAL at 15:31

## 2025-05-01 SDOH — SOCIAL STABILITY: SOCIAL NETWORK: HOW OFTEN DO YOU GET TOGETHER WITH FRIENDS OR RELATIVES?: TWICE A WEEK

## 2025-05-01 SDOH — HEALTH STABILITY: PHYSICAL HEALTH
HOW OFTEN DO YOU NEED TO HAVE SOMEONE HELP YOU WHEN YOU READ INSTRUCTIONS, PAMPHLETS, OR OTHER WRITTEN MATERIAL FROM YOUR DOCTOR OR PHARMACY?: NEVER

## 2025-05-01 SDOH — HEALTH STABILITY: PHYSICAL HEALTH: ON AVERAGE, HOW MANY DAYS PER WEEK DO YOU ENGAGE IN MODERATE TO STRENUOUS EXERCISE (LIKE A BRISK WALK)?: 3 DAYS

## 2025-05-01 SDOH — SOCIAL STABILITY: SOCIAL NETWORK: HOW OFTEN DO YOU ATTEND MEETINGS OF THE CLUBS OR ORGANIZATIONS YOU BELONG TO?: MORE THAN 4 TIMES PER YEAR

## 2025-05-01 SDOH — SOCIAL STABILITY: SOCIAL INSECURITY: HAVE YOU HAD THOUGHTS OF HARMING ANYONE ELSE?: NO

## 2025-05-01 SDOH — SOCIAL STABILITY: SOCIAL INSECURITY: ARE YOU MARRIED, WIDOWED, DIVORCED, SEPARATED, NEVER MARRIED, OR LIVING WITH A PARTNER?: MARRIED

## 2025-05-01 SDOH — SOCIAL STABILITY: SOCIAL INSECURITY: ARE THERE ANY APPARENT SIGNS OF INJURIES/BEHAVIORS THAT COULD BE RELATED TO ABUSE/NEGLECT?: NO

## 2025-05-01 SDOH — SOCIAL STABILITY: SOCIAL INSECURITY: WITHIN THE LAST YEAR, HAVE YOU BEEN AFRAID OF YOUR PARTNER OR EX-PARTNER?: NO

## 2025-05-01 SDOH — HEALTH STABILITY: MENTAL HEALTH: HOW MANY DRINKS CONTAINING ALCOHOL DO YOU HAVE ON A TYPICAL DAY WHEN YOU ARE DRINKING?: PATIENT DOES NOT DRINK

## 2025-05-01 SDOH — HEALTH STABILITY: PHYSICAL HEALTH: ON AVERAGE, HOW MANY MINUTES DO YOU ENGAGE IN EXERCISE AT THIS LEVEL?: 30 MIN

## 2025-05-01 SDOH — ECONOMIC STABILITY: HOUSING INSECURITY: IN THE PAST 12 MONTHS, HOW MANY TIMES HAVE YOU MOVED WHERE YOU WERE LIVING?: 0

## 2025-05-01 SDOH — SOCIAL STABILITY: SOCIAL INSECURITY: DO YOU FEEL ANYONE HAS EXPLOITED OR TAKEN ADVANTAGE OF YOU FINANCIALLY OR OF YOUR PERSONAL PROPERTY?: NO

## 2025-05-01 SDOH — HEALTH STABILITY: MENTAL HEALTH
DO YOU FEEL STRESS - TENSE, RESTLESS, NERVOUS, OR ANXIOUS, OR UNABLE TO SLEEP AT NIGHT BECAUSE YOUR MIND IS TROUBLED ALL THE TIME - THESE DAYS?: NOT AT ALL

## 2025-05-01 SDOH — SOCIAL STABILITY: SOCIAL NETWORK
DO YOU BELONG TO ANY CLUBS OR ORGANIZATIONS SUCH AS CHURCH GROUPS, UNIONS, FRATERNAL OR ATHLETIC GROUPS, OR SCHOOL GROUPS?: YES

## 2025-05-01 SDOH — SOCIAL STABILITY: SOCIAL INSECURITY
WITHIN THE LAST YEAR, HAVE YOU BEEN KICKED, HIT, SLAPPED, OR OTHERWISE PHYSICALLY HURT BY YOUR PARTNER OR EX-PARTNER?: NO

## 2025-05-01 SDOH — ECONOMIC STABILITY: INCOME INSECURITY: IN THE PAST 12 MONTHS HAS THE ELECTRIC, GAS, OIL, OR WATER COMPANY THREATENED TO SHUT OFF SERVICES IN YOUR HOME?: NO

## 2025-05-01 SDOH — ECONOMIC STABILITY: HOUSING INSECURITY: IN THE LAST 12 MONTHS, WAS THERE A TIME WHEN YOU WERE NOT ABLE TO PAY THE MORTGAGE OR RENT ON TIME?: NO

## 2025-05-01 SDOH — HEALTH STABILITY: MENTAL HEALTH: HOW OFTEN DO YOU HAVE A DRINK CONTAINING ALCOHOL?: NEVER

## 2025-05-01 SDOH — SOCIAL STABILITY: SOCIAL INSECURITY: WITHIN THE LAST YEAR, HAVE YOU BEEN HUMILIATED OR EMOTIONALLY ABUSED IN OTHER WAYS BY YOUR PARTNER OR EX-PARTNER?: NO

## 2025-05-01 SDOH — ECONOMIC STABILITY: FOOD INSECURITY: HOW HARD IS IT FOR YOU TO PAY FOR THE VERY BASICS LIKE FOOD, HOUSING, MEDICAL CARE, AND HEATING?: NOT HARD AT ALL

## 2025-05-01 SDOH — HEALTH STABILITY: MENTAL HEALTH: EXPERIENCED ANY OF THE FOLLOWING LIFE EVENTS: OTHER (COMMENT)

## 2025-05-01 SDOH — ECONOMIC STABILITY: HOUSING INSECURITY: AT ANY TIME IN THE PAST 12 MONTHS, WERE YOU HOMELESS OR LIVING IN A SHELTER (INCLUDING NOW)?: NO

## 2025-05-01 SDOH — SOCIAL STABILITY: SOCIAL INSECURITY: DO YOU FEEL UNSAFE GOING BACK TO THE PLACE WHERE YOU ARE LIVING?: NO

## 2025-05-01 SDOH — ECONOMIC STABILITY: FOOD INSECURITY: WITHIN THE PAST 12 MONTHS, THE FOOD YOU BOUGHT JUST DIDN'T LAST AND YOU DIDN'T HAVE MONEY TO GET MORE.: NEVER TRUE

## 2025-05-01 SDOH — ECONOMIC STABILITY: FOOD INSECURITY: WITHIN THE PAST 12 MONTHS, YOU WORRIED THAT YOUR FOOD WOULD RUN OUT BEFORE YOU GOT THE MONEY TO BUY MORE.: NEVER TRUE

## 2025-05-01 SDOH — SOCIAL STABILITY: SOCIAL INSECURITY
WITHIN THE LAST YEAR, HAVE YOU BEEN RAPED OR FORCED TO HAVE ANY KIND OF SEXUAL ACTIVITY BY YOUR PARTNER OR EX-PARTNER?: NO

## 2025-05-01 SDOH — SOCIAL STABILITY: SOCIAL NETWORK
IN A TYPICAL WEEK, HOW MANY TIMES DO YOU TALK ON THE PHONE WITH FAMILY, FRIENDS, OR NEIGHBORS?: MORE THAN THREE TIMES A WEEK

## 2025-05-01 SDOH — SOCIAL STABILITY: SOCIAL INSECURITY: DOES ANYONE TRY TO KEEP YOU FROM HAVING/CONTACTING OTHER FRIENDS OR DOING THINGS OUTSIDE YOUR HOME?: NO

## 2025-05-01 SDOH — HEALTH STABILITY: MENTAL HEALTH: HOW OFTEN DO YOU HAVE SIX OR MORE DRINKS ON ONE OCCASION?: NEVER

## 2025-05-01 SDOH — ECONOMIC STABILITY: TRANSPORTATION INSECURITY: IN THE PAST 12 MONTHS, HAS LACK OF TRANSPORTATION KEPT YOU FROM MEDICAL APPOINTMENTS OR FROM GETTING MEDICATIONS?: NO

## 2025-05-01 SDOH — SOCIAL STABILITY: SOCIAL NETWORK: HOW OFTEN DO YOU ATTEND CHURCH OR RELIGIOUS SERVICES?: MORE THAN 4 TIMES PER YEAR

## 2025-05-01 SDOH — SOCIAL STABILITY: SOCIAL INSECURITY: POSSIBLE ABUSE REPORTED TO:: OTHER (COMMENT)

## 2025-05-01 SDOH — SOCIAL STABILITY: SOCIAL INSECURITY: ABUSE: ADULT

## 2025-05-01 SDOH — SOCIAL STABILITY: SOCIAL INSECURITY: WERE YOU ABLE TO COMPLETE ALL THE BEHAVIORAL HEALTH SCREENINGS?: YES

## 2025-05-01 SDOH — SOCIAL STABILITY: SOCIAL INSECURITY: HAVE YOU HAD ANY THOUGHTS OF HARMING ANYONE ELSE?: NO

## 2025-05-01 SDOH — SOCIAL STABILITY: SOCIAL INSECURITY: ARE YOU OR HAVE YOU BEEN THREATENED OR ABUSED PHYSICALLY, EMOTIONALLY, OR SEXUALLY BY ANYONE?: NO

## 2025-05-01 SDOH — SOCIAL STABILITY: SOCIAL INSECURITY: HAS ANYONE EVER THREATENED TO HURT YOUR FAMILY OR YOUR PETS?: NO

## 2025-05-01 ASSESSMENT — COGNITIVE AND FUNCTIONAL STATUS - GENERAL
STANDING UP FROM CHAIR USING ARMS: A LOT
CLIMB 3 TO 5 STEPS WITH RAILING: A LITTLE
WALKING IN HOSPITAL ROOM: A LITTLE
DAILY ACTIVITIY SCORE: 24
CLIMB 3 TO 5 STEPS WITH RAILING: TOTAL
MOBILITY SCORE: 13
MOBILITY SCORE: 21
PATIENT BASELINE BEDBOUND: NO
MOVING TO AND FROM BED TO CHAIR: A LITTLE
MOVING TO AND FROM BED TO CHAIR: A LOT
TURNING FROM BACK TO SIDE WHILE IN FLAT BAD: A LITTLE
WALKING IN HOSPITAL ROOM: TOTAL

## 2025-05-01 ASSESSMENT — LIFESTYLE VARIABLES
SKIP TO QUESTIONS 9-10: 1
AUDIT-C TOTAL SCORE: 0
PRESCIPTION_ABUSE_PAST_12_MONTHS: NO
AUDIT-C TOTAL SCORE: 0
SKIP TO QUESTIONS 9-10: 1
SUBSTANCE_ABUSE_PAST_12_MONTHS: NO
HOW OFTEN DO YOU HAVE A DRINK CONTAINING ALCOHOL: NEVER
HOW MANY STANDARD DRINKS CONTAINING ALCOHOL DO YOU HAVE ON A TYPICAL DAY: PATIENT DOES NOT DRINK
AUDIT-C TOTAL SCORE: 0
HOW OFTEN DO YOU HAVE 6 OR MORE DRINKS ON ONE OCCASION: NEVER

## 2025-05-01 ASSESSMENT — PAIN SCALES - GENERAL
PAINLEVEL_OUTOF10: 0 - NO PAIN
PAINLEVEL_OUTOF10: 0 - NO PAIN
PAINLEVEL_OUTOF10: 8
PAINLEVEL_OUTOF10: 8
PAINLEVEL_OUTOF10: 4
PAINLEVEL_OUTOF10: 6
PAINLEVEL_OUTOF10: 2
PAINLEVEL_OUTOF10: 0 - NO PAIN
PAINLEVEL_OUTOF10: 8

## 2025-05-01 ASSESSMENT — ACTIVITIES OF DAILY LIVING (ADL)
WALKS IN HOME: INDEPENDENT
BATHING: INDEPENDENT
HEARING - RIGHT EAR: FUNCTIONAL
HEARING - LEFT EAR: FUNCTIONAL
JUDGMENT_ADEQUATE_SAFELY_COMPLETE_DAILY_ACTIVITIES: NO
LACK_OF_TRANSPORTATION: NO
DRESSING YOURSELF: INDEPENDENT
GROOMING: INDEPENDENT
FEEDING YOURSELF: INDEPENDENT
TOILETING: INDEPENDENT
LACK_OF_TRANSPORTATION: NO
PATIENT'S MEMORY ADEQUATE TO SAFELY COMPLETE DAILY ACTIVITIES?: NO
ADEQUATE_TO_COMPLETE_ADL: NO

## 2025-05-01 ASSESSMENT — PAIN DESCRIPTION - LOCATION
LOCATION: ANKLE
LOCATION: LEG
LOCATION: LEG

## 2025-05-01 ASSESSMENT — PAIN - FUNCTIONAL ASSESSMENT
PAIN_FUNCTIONAL_ASSESSMENT: 0-10

## 2025-05-01 ASSESSMENT — PAIN DESCRIPTION - DESCRIPTORS: DESCRIPTORS: ACHING

## 2025-05-01 ASSESSMENT — PAIN DESCRIPTION - ORIENTATION
ORIENTATION: LEFT

## 2025-05-01 NOTE — ED PROVIDER NOTES
HPI   Chief Complaint   Patient presents with    Fall    Ankle Pain       Patient is a healthy nontoxic-appearing 79-year-old female with past medical history of bronchitis, axillary adenopathy, esophageal reflux, cancer of appendix, neuralgia, peripheral neuropathy, DVT, anxiety, presents the emergency room today for complaint of fall and left ankle pain.  Patient states she missed this last step when she fell forward injuring and rolling her left ankle and foot awkwardly.  Patient denies striking her head, any loss of consciousness, headache pain, visual disturbances, numbness or tingling.  Patient denies any radiation of pain up to the knee or hip.  Patient states she was unable to bear weight afterwards due to discomfort.  Patient denies any chest pain, shortness of breath or difficulty breathing, abdominal pain, nausea or vomiting, fever, shaking, or chills.              Patient History   Medical History[1]  Surgical History[2]  Family History[3]  Social History[4]    Physical Exam   ED Triage Vitals   Temp Heart Rate Resp BP   04/30/25 2203 04/30/25 2203 04/30/25 2203 04/30/25 2203   37 °C (98.6 °F) 82 16 180/74      SpO2 Temp Source Heart Rate Source Patient Position   04/30/25 2203 05/01/25 0323 04/30/25 2203 05/01/25 0323   95 % Temporal Monitor Lying      BP Location FiO2 (%)     04/30/25 2203 --     Right arm        Physical Exam  Vitals and nursing note reviewed.   Constitutional:       General: She is not in acute distress.     Appearance: Normal appearance. She is not ill-appearing, toxic-appearing or diaphoretic.   HENT:      Head: Normocephalic.   Eyes:      General:         Right eye: No discharge.         Left eye: No discharge.      Extraocular Movements: Extraocular movements intact.      Pupils: Pupils are equal, round, and reactive to light.   Cardiovascular:      Rate and Rhythm: Normal rate and regular rhythm.      Pulses: Normal pulses.      Heart sounds: Normal heart sounds. No murmur  heard.     No friction rub. No gallop.   Pulmonary:      Effort: Pulmonary effort is normal. No respiratory distress.      Breath sounds: Normal breath sounds. No stridor. No wheezing, rhonchi or rales.   Chest:      Chest wall: No tenderness.   Musculoskeletal:         General: Swelling, tenderness and signs of injury present. No deformity. Normal range of motion.      Cervical back: Normal range of motion and neck supple.      Right lower leg: No edema.      Left lower leg: No edema.      Comments: Reproducible tenderness upon palpation diffusely over the left ankle with mild amount of ecchymosis and edema present to the lateral ankle, no midfoot tenderness upon palpation, DP PT pulse are strong and regular, cap refill less than 3 seconds.   Skin:     General: Skin is warm.      Capillary Refill: Capillary refill takes less than 2 seconds.      Coloration: Skin is not jaundiced or pale.      Findings: No bruising, erythema, lesion or rash.   Neurological:      General: No focal deficit present.      Mental Status: She is alert and oriented to person, place, and time.           ED Course & MDM   ED Course as of 05/01/25 0431   Thu May 01, 2025   0037 X-ray of the left ankle reveals  IMPRESSION:  Obliquely oriented fracture through the distal fibular diaphysis with  probable extension to the distal tib-fib syndesmosis. Minimally  displaced medial malleolar fracture. Mildly posterior displaced  posterior malleolus fracture.   [EC]      ED Course User Index  [EC] JUAN DAVID Berger-CNP         Diagnoses as of 05/01/25 0431   Fall, initial encounter   Closed trimalleolar fracture of left ankle, initial encounter   Left trimalleolar fracture, closed, initial encounter                 No data recorded     [place] Coma Scale Score: 15 (05/01/25 0337 : Nubia Bonilla RN)                           Medical Decision Making  Given patient's complaint presentation a thorough exam was performed.  On exam patient has  Reproducible tenderness upon palpation diffusely over the left ankle with mild amount of ecchymosis and edema present to the lateral ankle, no midfoot tenderness upon palpation, DP PT pulse are strong and regular, cap refill less than 3 seconds.  Remains hemodynamically stable during emergency evaluation, is afebrile, have a low suspicion for vascular compromise, flexor or extensor tendon injury.  X-ray was ordered of the left ankle and interpreted by radiologist reveals concern for trimalleolar fracture.  I consulted orthopedic specialist Dr. Valle in regards to findings recommends medical admission and he will evaluate tomorrow morning.  Discussion was had with patient regards to splinting however states she cannot use crutches.  I consulted hospitalist Dr. Wang in regards to admission for pain and trimalleolar fracture.  Dr. Wang has agreed to admit the patient to her service and will manage inpatient care.    Eusebio Small, JUAN DAVID-CNP     Portions of this note were generated using digital voice recognition software, and may contain grammatical errors      Procedure  Procedures       [1]   Past Medical History:  Diagnosis Date    Anxiety     Anxiety disorder, unspecified     Anxiety    Cystocele, unspecified (CODE) 01/03/2017    Vaginal prolapse    Depression, unspecified     Depressive disorder    Personal history of diseases of the skin and subcutaneous tissue     History of actinic keratosis    Personal history of malignant neoplasm of other digestive organs 05/06/2020    History of malignant neoplasm of appendix    Personal history of other diseases of the digestive system     History of gastroesophageal reflux (GERD)    Personal history of other diseases of the musculoskeletal system and connective tissue     History of osteopenia    Personal history of other malignant neoplasm of skin     History of basal cell carcinoma    Personal history of other medical treatment     History of screening mammography     Personal history of other specified conditions 06/07/2019    History of urinary frequency    Personal history of other specified conditions     History of atypical nevus    Unilateral primary osteoarthritis, right knee     Primary osteoarthritis of right knee   [2]   Past Surgical History:  Procedure Laterality Date    BREAST BIOPSY Left     LT exc bx 2018    CHOLECYSTECTOMY  09/29/2014    Cholecystectomy    OTHER SURGICAL HISTORY  09/29/2014    Salpingo-oophorectomy    OTHER SURGICAL HISTORY  09/29/2014    Right Hemicolectomy    OTHER SURGICAL HISTORY  09/29/2014    Resection Of Omentum   [3]   Family History  Problem Relation Name Age of Onset    Breast cancer Mother      Breast cancer Mother's Sister     [4]   Social History  Tobacco Use    Smoking status: Never    Smokeless tobacco: Never   Vaping Use    Vaping status: Never Used   Substance Use Topics    Alcohol use: Not Currently     Comment: rare    Drug use: Never        ION Berger  05/01/25 0431       ION Berger  05/01/25 5613

## 2025-05-01 NOTE — PROGRESS NOTES
"                                               Patient Name: Trinh Vences   YOB: 1945    Subjective:  Pt denies any severe pain in her ankle.     Objective:    Vitals:    05/01/25 0300 05/01/25 0323 05/01/25 0323 05/01/25 0715   BP: (!) 189/86 159/72 159/72 138/61   BP Location:  Right arm Right arm Right arm   Patient Position:  Lying Lying Lying   Pulse: 85 65 65 81   Resp: 16 16 16 18   Temp:  36.4 °C (97.5 °F) 36.4 °C (97.5 °F) 35.7 °C (96.3 °F)   TempSrc:  Temporal Temporal Temporal   SpO2: 94% 91% 91% 93%   Weight:  68 kg (150 lb)     Height:  1.676 m (5' 5.98\")         Physical Exam:    GEN: Awake and alert. Not in acute distress.   HEENT: Normocephalic and atraumatic.  Mucous membranes moist.  Clear sclera, PERRL, EOMI.  CARDIO: Regular rate and rhythm.  No murmurs, rubs, or gallops. No LE edema  RESP: Clear to auscultation b/l. No wheezes, rales or rhonchi. Good respiratory effort.   ABD: +BS x4, soft, non-tender. Not distended. No rebound or guarding.  MSK: left ankle pain  NEURO: A&O X 3. CN II-XII are grossly intact. No focal deficits.   SKIN: Warm and dry, no lesions, no rashes.  PSYCH: Appropriate mood and affect, no hallucinations.     Scheduled medications  Scheduled Medications[1]  Continuous medications  Continuous Medications[2]  PRN medications  PRN Medications[3]     Assessment and Plan:  Trinh Vences is a 79 y.o. 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  Pain control with Morphine ordered as needed.  Consulted orthopedic surgery.  No surgical intervention recommended at this time, nonweightbearing (toe-touch) LLE.  Follow up outpatient, if alignment " of fracture worsens, then may consider surgery in next 2-3 weeks.   PT and OT evaluation.    I spent 25 minutes in the professional and overall care of this patient. More than 50% of this time was spent examining and counseling patient, reviewing plan of care, and coordinating medical care.    Luis Felipe Thibodeaux DO  Senior Attending Physician  [facility]  Clinical Instructor             [1] aspirin, 81 mg, oral, Daily  cyanocobalamin, 1,000 mcg, oral, Daily  enoxaparin, 40 mg, subcutaneous, q24h  oxybutynin, 5 mg, oral, BID  pantoprazole, 20 mg, oral, Daily before breakfast  traZODone, 50 mg, oral, Nightly    [2]    [3] PRN medications: acetaminophen **OR** [DISCONTINUED] acetaminophen **OR** [DISCONTINUED] acetaminophen, hydrALAZINE, melatonin, morphine, morphine, polyethylene glycol

## 2025-05-01 NOTE — CARE PLAN
The patient's goals for the shift include pain control, safety and comfort    The clinical goals for the shift include Pt will remain safe and comfortable during the shift      Problem: Pain - Adult  Goal: Verbalizes/displays adequate comfort level or baseline comfort level  Outcome: Progressing     Problem: Safety - Adult  Goal: Free from fall injury  Outcome: Progressing     Problem: Discharge Planning  Goal: Discharge to home or other facility with appropriate resources  Outcome: Progressing     Problem: Chronic Conditions and Co-morbidities  Goal: Patient's chronic conditions and co-morbidity symptoms are monitored and maintained or improved  Outcome: Progressing     Problem: Nutrition  Goal: Nutrient intake appropriate for maintaining nutritional needs  Outcome: Progressing     Problem: Diabetes  Goal: Achieve decreasing blood glucose levels by end of shift  Outcome: Progressing  Goal: Increase stability of blood glucose readings by end of shift  Outcome: Progressing  Goal: Decrease in ketones present in urine by end of shift  Outcome: Progressing  Goal: Maintain electrolyte levels within acceptable range throughout shift  Outcome: Progressing  Goal: Maintain glucose levels >70mg/dl to <250mg/dl throughout shift  Outcome: Progressing  Goal: No changes in neurological exam by end of shift  Outcome: Progressing  Goal: Learn about and adhere to nutrition recommendations by end of shift  Outcome: Progressing  Goal: Vital signs within normal range for age by end of shift  Outcome: Progressing  Goal: Increase self care and/or family involovement by end of shift  Outcome: Progressing  Goal: Receive DSME education by end of shift  Outcome: Progressing     Problem: Pain  Goal: Takes deep breaths with improved pain control throughout the shift  Outcome: Progressing  Goal: Turns in bed with improved pain control throughout the shift  Outcome: Progressing  Goal: Walks with improved pain control throughout the  shift  Outcome: Progressing  Goal: Performs ADL's with improved pain control throughout shift  Outcome: Progressing  Goal: Participates in PT with improved pain control throughout the shift  Outcome: Progressing  Goal: Free from opioid side effects throughout the shift  Outcome: Progressing  Goal: Free from acute confusion related to pain meds throughout the shift  Outcome: Progressing

## 2025-05-01 NOTE — PROGRESS NOTES
05/01/25 1049   Discharge Planning   Living Arrangements Spouse/significant other   Support Systems Spouse/significant other   Assistance Needed patient was indepenent prior to fall   Type of Residence Private residence  (2 level home; bedroom and bathroom on main floor; laundry in basement)   Number of Stairs to Enter Residence 3   Number of Stairs Within Residence 13   Intensity of Service   Intensity of Service 0-30 min     Care transitions at bedside to complete assessment with patient and spouse, Ashwin at bedside.  TCC introduced self and explained role.  Patient demographics reviewed and verified.  Per Ashwin, stated that him and his wife just returned from 4 months in [place] where they have a home that everything is on one level.  Stated that they were just home for a few hours when the patient fell going downstairs at the bottom step.  Patient stated that she was independent prior to the fall and did not use assistive devices.  Currently pending Ortho consult at this time.  Care transitions to follow for discharge planning.      PCP: Gage Angela  Insurance: [employer]  Pharmacy: [facility]

## 2025-05-01 NOTE — CARE PLAN
The patient's goals for the shift include pain control, safety and comfort    The clinical goals for the shift include pain control, safety and comfort      Problem: Pain - Adult  Goal: Verbalizes/displays adequate comfort level or baseline comfort level  Outcome: Progressing     Problem: Safety - Adult  Goal: Free from fall injury  Outcome: Progressing     Problem: Discharge Planning  Goal: Discharge to home or other facility with appropriate resources  Outcome: Progressing     Problem: Chronic Conditions and Co-morbidities  Goal: Patient's chronic conditions and co-morbidity symptoms are monitored and maintained or improved  Outcome: Progressing     Problem: Nutrition  Goal: Nutrient intake appropriate for maintaining nutritional needs  Outcome: Progressing     Problem: Diabetes  Goal: Achieve decreasing blood glucose levels by end of shift  Outcome: Progressing  Goal: Increase stability of blood glucose readings by end of shift  Outcome: Progressing  Goal: Decrease in ketones present in urine by end of shift  Outcome: Progressing  Goal: Maintain electrolyte levels within acceptable range throughout shift  Outcome: Progressing  Goal: Maintain glucose levels >70mg/dl to <250mg/dl throughout shift  Outcome: Progressing  Goal: No changes in neurological exam by end of shift  Outcome: Progressing  Goal: Learn about and adhere to nutrition recommendations by end of shift  Outcome: Progressing  Goal: Vital signs within normal range for age by end of shift  Outcome: Progressing  Goal: Increase self care and/or family involovement by end of shift  Outcome: Progressing  Goal: Receive DSME education by end of shift  Outcome: Progressing     Problem: Pain  Goal: Takes deep breaths with improved pain control throughout the shift  Outcome: Progressing  Goal: Turns in bed with improved pain control throughout the shift  Outcome: Progressing  Goal: Walks with improved pain control throughout the shift  Outcome:  Progressing  Goal: Performs ADL's with improved pain control throughout shift  Outcome: Progressing  Goal: Participates in PT with improved pain control throughout the shift  Outcome: Progressing  Goal: Free from opioid side effects throughout the shift  Outcome: Progressing  Goal: Free from acute confusion related to pain meds throughout the shift  Outcome: Progressing

## 2025-05-01 NOTE — ED TRIAGE NOTES
Brought in by EMS from home, missed last step and thinks she injured her left ankle with pain. Denies hitting head, no LOC, not on thinners. Denies PMH.

## 2025-05-01 NOTE — PROGRESS NOTES
Physical Therapy    Physical Therapy Evaluation & Treatment    Patient Name: Trinh Vences  MRN: [id number]  Today's Date: 5/1/2025   Time Calculation  Start Time: 1204  Stop Time: 1246  Time Calculation (min): 42 min  702/702-A    Assessment/Plan   PT Assessment  PT Assessment Results: Decreased endurance, Impaired balance, Decreased mobility, Decreased safety awareness, Orthopedic restrictions  Rehab Prognosis: Good  Barriers to Discharge Home: Caregiver assistance, Physical needs  Caregiver Assistance: Caregiver assistance needed per identified barriers - however, level of patient's required assistance exceeds assistance available at home  Physical Needs: Ambulating household distances limited by function/safety, Stair navigation into home limited by function/safety  Evaluation/Treatment Tolerance: Patient limited by fatigue (limited by ttwb status & light headedness)  End of Session Communication: Bedside nurse  Assessment Comment: Continued skilled PT intervention indicated to facilitate increased strength, balance & gait stability  End of Session Patient Position: Up in chair, Alarm off, not on at start of session (call light in reach, le's supported on footstool)  IP OR SWING BED PT PLAN  Inpatient or Swing Bed: Inpatient  PT Plan  Treatment/Interventions: Bed mobility, Transfer training, Gait training, Stair training, Balance training, Therapeutic activity, Therapeutic exercise, Wheelchair management  PT Plan: Ongoing PT  PT Frequency: 5 times per week  PT Discharge Recommendations: High intensity level of continued care  PT Recommended Transfer Status: Assist x2  PT - OK to Discharge: Yes (to next level of care when cleared by medical team)    Current Problem:  Problem List[1]  depression, OAB, osteoporosis, GERD, L breast intraductal papilloma s/p excisional bx, hx adenocarcinoma of appendix s/p right hemicolectomy, hx RLE DVT s/p thrombectomy & IVC filter   Subjective     General Visit  Information:  General  Reason for Referral: PT eval & treat/impaired mobility DX: fall, lt trimalleolar fracture; 4/30/25 fell going downstairs misstepped & twisted lt anklt on last step/lt ankle pain: ortho: recommend nonweightbearing (toe-touch) treatment with close observation.  Advised if alignment is loss surgical intervention in the next 2 to 3 weeks could still be performed.  Referred By: Chris Amaral  Family/Caregiver Present:  (spouse Ashwin present, supportive)  Caregiver Feedback: Per conference w/ RN patient stable to participate in therapy  Patient Position Received: Bed, 2 rail up, Alarm off, not on at start of session  General Comment: Pleasant & cooperative, receptive to mobility& instructions; difficulty mantaining lle ttwb/required assist w/ lt foot rested on therapist's foot to maintain, report of light headedness limiting tolerance to & safety w/ wbing activty    Home Living:  Home Living  Home Living Comments: At baseline lives w/ spouse who is home & able to assist, 3steps to enter house w/ basement laundry otherwise 1st fl setup; Pt plans for d/c to daughter's house/daughter works from home; threshold step to enter 1st fl setup; walk in shower that was recently remodeled/pt unsure of dme; standard ht tollet near sink; standard bed    Prior Level of Function:  Prior Function Per Pt/Caregiver Report  Prior Function Comments: independent mobility w/o use of device, does not own assistive devices/dme; no prior h/o falls;  independent ald & iadl    Precautions:  Precautions  Precautions Comment: fall, Ambulation, toe-touch on left with walker.   Encourage range of motion all limbs including isometrics within splint left ankle    Vital Signs:  Vital Signs  Heart Rate:  (86)  SpO2:  (94)  BP:  (159/75)  Objective     Pain:  Pain Assessment  Pain Assessment: 0-10  0-10 (Numeric) Pain Score: 0 - No pain    Cognition:  Cognition  Overall Cognitive Status: Within Functional Limits    General  Assessments:      Activity Tolerance  Endurance: Decreased tolerance for upright activites  Sensation  Light Touch: No apparent deficits     Functional Assessments:     Bed Mobility  Bed Mobility:  (sba supine>sit effortful, dizzy / light headed upon sitting)  Transfers  Transfer:  (mod assist x1 sit>stand elevated bed>ww, cues & assist to maintain lle ttwb/pt's foot placed on therapist's foot for support & cues; min assist x1 stand>sit cues for safe hand plcmt/safe position to chair& eccentric control)  Ambulation/Gait Training  Ambulation/Gait Training Performed:  (mod assist x1 w/ ww & gait belt ~3ft pivot steps bed>chair, assist to manage ww, lt foot placed on therapist's foot to maintain lle ttwb, pt w/ persistent light headedness as well as difficulty maintaining wbing status limiting further gait activity)     Extremity/Trunk Assessments:   RLE   RLE :  (arom & strength grossly 4/5)  LLE   LLE :  (hip/knee arom wfl, strength grossly 4/5)    Treatments:   Therex: supine ble therex to facilitate increased functional mobility ; performed heel slides, sliding hip abduction, slr x10 each w/ cues for proper technique   Standing balance activity: min assist w/ ww support & gait belt, practiced maintaining lle ttwb w/ foot placed on therapist's foot, mod cues for maintaining        Bed Mobility  Bed Mobility:  (sba supine>sit effortful, dizzy / light headed upon sitting)  Ambulation/Gait Training  Ambulation/Gait Training Performed:  (mod assist x1 w/ ww & gait belt ~3ft pivot steps bed>chair, assist to manage ww, lt foot placed on therapist's foot to maintain lle ttwb, pt w/ persistent light headedness as well as difficulty maintaining wbing status limiting further gait activity)  Transfers  Transfer:  (mod assist x1 sit>stand elevated bed>ww, cues & assist to maintain lle ttwb/pt's foot placed on therapist's foot for support & cues; min assist x1 stand>sit cues for safe hand plcmt/safe position to chair& eccentric  control)       Outcome Measures:  [facility] Basic Mobility  Turning from your back to your side while in a flat bed without using bedrails: None  Moving from lying on your back to sitting on the side of a flat bed without using bedrails: A little  Moving to and from bed to chair (including a wheelchair): A lot  Standing up from a chair using your arms (e.g. wheelchair or bedside chair): A lot  To walk in hospital room: Total  Climbing 3-5 steps with railing: Total  Basic Mobility - Total Score: 13   Goals:  Encounter Problems       Encounter Problems (Active)       PT Problem       STG - Pt will transition supine <> sitting with supervision  (Progressing)       Start:  05/01/25    Expected End:  05/15/25            STG - Pt will transfer STS with sba maintaining lle TTWB  (Progressing)       Start:  05/01/25    Expected End:  05/15/25            STG - Pt will amb >=10' using ww maintaining LLE TTWB with cga  (Progressing)       Start:  05/01/25    Expected End:  05/15/25            STG -  Pt will navigate a threshold step simulating home set up using ww maintaining LLE TTWB with min assist x1  (Progressing)       Start:  05/01/25    Expected End:  05/15/25            STG-Pt will self propel WC on level surface using ue's & rle >=40ft w/ sba (Progressing)       Start:  05/01/25    Expected End:  05/15/25               Pain - Adult            Education Documentation  Mobility Training, taught by Delma Rodriguez, PT at 5/1/2025  2:11 PM.  Learner: Significant Other, Patient  Readiness: Acceptance  Method: Explanation  Response: Verbalizes Understanding, Needs Reinforcement  Comment: safety, actiivty progression, use of ww , LLE TTWB status, activity progression, le elevation          [1]   Patient Active Problem List  Diagnosis    Abnormal mammogram    Abnormal ultrasound of breast    Acute bronchitis    Acute left lower quadrant pain    Acute UTI    Axillary adenopathy    Back pain    Low back pain, episodic    Chronic  GERD    Elevated blood sugar level    Fibrocystic changes of left breast    Fibrocystic changes of right breast    Frequency of micturition    Heel pain    History of depression    Intraductal papilloma of left breast    Left flank pain    Left groin pain    Cancer of appendix (Multi)    Neuralgia    Peripheral neuropathy    Plantar fasciitis, left    Presence of IVC filter    Recurrent major depressive disorder    Right ankle swelling    Right leg DVT (Multi)    Tiredness    NARCISO (generalized anxiety disorder)    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    Left trimalleolar fracture, closed, initial encounter

## 2025-05-01 NOTE — CONSULTS
Reason For Consult  Left ankle injury    History Of Present Illness  Trinh Vences is a 79 y.o. female presenting with left ankle pain following injury in which she missed 1 step twisted left ankle.  No other injuries reported.  No loss of consciousness.  Patient generally does not use any walking aids.  Denies any complaints regarding arms, right leg.  No groin pain.  No numbness or tingling..     Past Medical History  History of right leg DVT 5 or 6 years ago, at that time treated with Xarelto.  She has a past medical history of Anxiety, Anxiety disorder, unspecified, Cystocele, unspecified (CODE) (01/03/2017), Depression, unspecified, Personal history of diseases of the skin and subcutaneous tissue, Personal history of malignant neoplasm of other digestive organs (05/06/2020), Personal history of other diseases of the digestive system, Personal history of other diseases of the musculoskeletal system and connective tissue, Personal history of other malignant neoplasm of skin, Personal history of other medical treatment, Personal history of other specified conditions (06/07/2019), Personal history of other specified conditions, and Unilateral primary osteoarthritis, right knee.    Surgical History  She has a past surgical history that includes Other surgical history (09/29/2014); Other surgical history (09/29/2014); Other surgical history (09/29/2014); Cholecystectomy (09/29/2014); and Breast biopsy (Left).     Social History  She reports that she has never smoked. She has never used smokeless tobacco. She reports that she does not currently use alcohol. She reports that she does not use drugs.    Family History  Family History[1]     Allergies  Ciprofloxacin and Simvastatin    Review of Systems  As above     Physical Exam  Left lower leg ankle in a well fabricated medial lateral, posterior splint.  Neurovascular is good axial pressure through the splint is asymptomatic.  Knee and hip exam unremarkable left  "leg.     Last Recorded Vitals  Blood pressure 138/61, pulse 81, temperature 35.7 °C (96.3 °F), temperature source Temporal, resp. rate 18, height 1.676 m (5' 5.98\"), weight 68 kg (150 lb), SpO2 93%.    Relevant Results      Scheduled medications  Scheduled Medications[2]  Continuous medications  Continuous Medications[3]  PRN medications  PRN Medications[4]  Results for orders placed or performed during the hospital encounter of 04/30/25 (from the past 24 hours)   CBC and Auto Differential   Result Value Ref Range    WBC 14.9 (H) 4.4 - 11.3 x10*3/uL    nRBC 0.0 0.0 - 0.0 /100 WBCs    RBC 4.93 4.00 - 5.20 x10*6/uL    Hemoglobin 14.9 12.0 - 16.0 g/dL    Hematocrit 46.0 36.0 - 46.0 %    MCV 93 80 - 100 fL    MCH 30.2 26.0 - 34.0 pg    MCHC 32.4 32.0 - 36.0 g/dL    RDW 13.2 11.5 - 14.5 %    Platelets 179 150 - 450 x10*3/uL    Neutrophils % 75.9 40.0 - 80.0 %    Immature Granulocytes %, Automated 0.3 0.0 - 0.9 %    Lymphocytes % 15.3 13.0 - 44.0 %    Monocytes % 7.0 2.0 - 10.0 %    Eosinophils % 1.2 0.0 - 6.0 %    Basophils % 0.3 0.0 - 2.0 %    Neutrophils Absolute 11.26 (H) 1.60 - 5.50 x10*3/uL    Immature Granulocytes Absolute, Automated 0.05 0.00 - 0.50 x10*3/uL    Lymphocytes Absolute 2.27 0.80 - 3.00 x10*3/uL    Monocytes Absolute 1.04 (H) 0.05 - 0.80 x10*3/uL    Eosinophils Absolute 0.18 0.00 - 0.40 x10*3/uL    Basophils Absolute 0.05 0.00 - 0.10 x10*3/uL   Magnesium   Result Value Ref Range    Magnesium 2.02 1.60 - 2.40 mg/dL   Coagulation Screen   Result Value Ref Range    Protime 10.4 9.8 - 12.4 seconds    INR 0.9 0.9 - 1.1    aPTT 25 (L) 26 - 36 seconds   Type And Screen   Result Value Ref Range    ABO TYPE B     Rh TYPE NEG     ANTIBODY SCREEN NEG    Comprehensive Metabolic Panel   Result Value Ref Range    Glucose 128 (H) 74 - 99 mg/dL    Sodium 139 136 - 145 mmol/L    Potassium 3.8 3.5 - 5.3 mmol/L    Chloride 105 98 - 107 mmol/L    Bicarbonate 25 21 - 32 mmol/L    Anion Gap 13 10 - 20 mmol/L    Urea " Nitrogen 17 6 - 23 mg/dL    Creatinine 0.67 0.50 - 1.05 mg/dL    eGFR 88 >60 mL/min/1.73m*2    Calcium 9.3 8.6 - 10.3 mg/dL    Albumin 4.2 3.4 - 5.0 g/dL    Alkaline Phosphatase 54 33 - 136 U/L    Total Protein 6.7 6.4 - 8.2 g/dL    AST 15 9 - 39 U/L    Bilirubin, Total 0.6 0.0 - 1.2 mg/dL    ALT 16 7 - 45 U/L   VERIFY ABO/Rh Group Test   Result Value Ref Range    ABO TYPE B     Rh TYPE NEG    X-rays, 3 views left ankle reviewed demonstrating a trimalleolar fracture.  Medial malleolus intra-articular transverse with 1 mm lateral offset.  Oblique view distal fibula similarly has 1 to 2 mm offset but well-maintained ankle mortise.  Avulsion fracture posterior malleolus less than 10% of the joint.  Overall bone quality and articular surface good     Assessment/Plan     Left trimalleolar ankle fracture, intra-articular component, with very minimal displacement.  Well-maintained ankle mortise.  Discussed with patient  recommend nonweightbearing (toe-touch) treatment with close observation.  Advised if alignment is loss surgical intervention in the next 2 to 3 weeks could still be performed.  History DVT discussed options agreeable to aspirin twice daily strongly encouraged and instructed regarding motion exercises.  Okay to discharge as soon as safe.  Follow-up next week for office follow-up and updated x-rays.    Chris Amaral MD         [1]   Family History  Problem Relation Name Age of Onset    Breast cancer Mother      Breast cancer Mother's Sister     [2] aspirin, 81 mg, oral, Daily  cyanocobalamin, 1,000 mcg, oral, Daily  enoxaparin, 40 mg, subcutaneous, q24h  oxybutynin, 5 mg, oral, BID  pantoprazole, 20 mg, oral, Daily before breakfast  traZODone, 50 mg, oral, Nightly    [3]    [4] PRN medications: acetaminophen **OR** [DISCONTINUED] acetaminophen **OR** [DISCONTINUED] acetaminophen, hydrALAZINE, melatonin, morphine, morphine, polyethylene glycol

## 2025-05-01 NOTE — H&P
"Hospital Medicine History & Physical    Subjective:  Trinh Vences is a 79 y.o. female with depression, OAB, osteoporosis, GERD, L breast intraductal papilloma s/p excisional bx, hx adenocarcinoma of appendix s/p right hemicolectomy, hx RLE DVT s/p thrombectomy & IVC filter, who presents with left ankle pain and swelling. Pt was walking down the stairs when she missed a step and fell twisting her left ankle. Denies hitting her head, LOC or AC. Was in her normal state of health prior to this. Denies any additional injuries or other complaints.       A 10 point ROS was completed and is negative expect as stated in HPI.     PMHx: As above  PSHx: Cholecystectomy, salpingo-oophorectomy, L breast excisional bx, RLE thrombectomy, IVC filter placement   Social Hx: never smoker, denies alcohol use, denies illicit drug use  Family Hx: breast ca    Objective:    /80   Pulse 84   Temp 37 °C (98.6 °F)   Resp 16   Ht 1.676 m (5' 6\")   Wt 68 kg (150 lb)   SpO2 94%   BMI 24.21 kg/m²     Physical Exam:  General: A&Ox3, no distress, cooperative  HEENT: NC/AT, clear sclera, MMM  NECK: Supple  Cardiovascular: RRR, no murmurs. S1/S2  Respiratory: CTAB, no RRW or crackles. No distress  Abdomen: Soft, ND, non-tende  Extremities: Mild left ankle swelling, tenderness with palpation   Neurological: A&Ox3. No focal deficits  Skin: Warm and dry, no rashes  Psych: appropriate mood and affect    I personally reviewed all imaging, labs and notes/ documentation.     Assessment & Plan:    Left trimalleolar fracture  Leukocytosis, likely reactive    Depression  OAB  Osteoporosis  GERD  Hx L breast intraductal papilloma s/p excisional bx  Hx adenocarcinoma of appendix s/p right hemicolectomy  Hx RLE DVT s/p thrombectomy & IVC filter    NPO, pain control, consult ortho, ED spoke with Dr. Valle, RCRI 0 patient medically cleared for surgery. Will need therapy evals post surgery  Home meds not reconciled, will resume when updated    DVT " Prophylaxis: Lovenox, SCDs  Code Status: Full Code confirmed with pt  Disposition: Med/surg      Marlen Wang DO  Hospitalist

## 2025-05-02 PROCEDURE — 97166 OT EVAL MOD COMPLEX 45 MIN: CPT | Mod: GO

## 2025-05-02 PROCEDURE — 1100000001 HC PRIVATE ROOM DAILY

## 2025-05-02 PROCEDURE — 2500000002 HC RX 250 W HCPCS SELF ADMINISTERED DRUGS (ALT 637 FOR MEDICARE OP, ALT 636 FOR OP/ED): Performed by: INTERNAL MEDICINE

## 2025-05-02 PROCEDURE — 97116 GAIT TRAINING THERAPY: CPT | Mod: GP

## 2025-05-02 PROCEDURE — 2500000001 HC RX 250 WO HCPCS SELF ADMINISTERED DRUGS (ALT 637 FOR MEDICARE OP): Performed by: INTERNAL MEDICINE

## 2025-05-02 PROCEDURE — 99232 SBSQ HOSP IP/OBS MODERATE 35: CPT | Performed by: HOSPITALIST

## 2025-05-02 RX ADMIN — OXYBUTYNIN CHLORIDE 5 MG: 5 TABLET ORAL at 20:48

## 2025-05-02 RX ADMIN — Medication 1000 MCG: at 08:07

## 2025-05-02 RX ADMIN — ASPIRIN 81 MG: 81 TABLET, COATED ORAL at 20:48

## 2025-05-02 RX ADMIN — ASPIRIN 81 MG: 81 TABLET, COATED ORAL at 08:07

## 2025-05-02 RX ADMIN — TRAZODONE HYDROCHLORIDE 50 MG: 50 TABLET ORAL at 20:48

## 2025-05-02 RX ADMIN — OXYBUTYNIN CHLORIDE 5 MG: 5 TABLET ORAL at 08:07

## 2025-05-02 ASSESSMENT — COGNITIVE AND FUNCTIONAL STATUS - GENERAL
CLIMB 3 TO 5 STEPS WITH RAILING: TOTAL
PERSONAL GROOMING: A LITTLE
MOBILITY SCORE: 21
DRESSING REGULAR UPPER BODY CLOTHING: A LITTLE
STANDING UP FROM CHAIR USING ARMS: A LOT
DAILY ACTIVITIY SCORE: 14
TOILETING: TOTAL
WALKING IN HOSPITAL ROOM: A LITTLE
MOVING TO AND FROM BED TO CHAIR: A LOT
HELP NEEDED FOR BATHING: A LOT
MOBILITY SCORE: 14
DAILY ACTIVITIY SCORE: 24
DRESSING REGULAR LOWER BODY CLOTHING: TOTAL
TURNING FROM BACK TO SIDE WHILE IN FLAT BAD: A LITTLE
CLIMB 3 TO 5 STEPS WITH RAILING: A LOT
WALKING IN HOSPITAL ROOM: A LOT

## 2025-05-02 ASSESSMENT — PAIN SCALES - GENERAL
PAINLEVEL_OUTOF10: 4
PAINLEVEL_OUTOF10: 4
PAINLEVEL_OUTOF10: 0 - NO PAIN
PAINLEVEL_OUTOF10: 0 - NO PAIN

## 2025-05-02 ASSESSMENT — PAIN - FUNCTIONAL ASSESSMENT
PAIN_FUNCTIONAL_ASSESSMENT: 0-10
PAIN_FUNCTIONAL_ASSESSMENT: 0-10

## 2025-05-02 NOTE — PROGRESS NOTES
Met with patient and spouse at bedside. SNF list given for review.       5/2 250pm   Patient gave choices. #1 [facility], #2 [facility], #3 [facility]. Requested referrals be placed in care port via [facility].

## 2025-05-02 NOTE — PROGRESS NOTES
Patient Name: Trnih Vences   YOB: 1945    Subjective:  Seen and examined  Clinically stable  Pain is better  Slightly dizzy today  No chest pain or shortness of breath     Objective:    Vitals:    05/01/25 1501 05/01/25 2047 05/02/25 0403 05/02/25 0725   BP: 121/56 135/63 135/60 135/64   BP Location: Right arm Right arm Right arm Right arm   Patient Position: Lying Lying Lying Lying   Pulse: 80 81 78 78   Resp: 18 18 18 18   Temp: 36.5 °C (97.7 °F) 36.8 °C (98.2 °F) 36.9 °C (98.4 °F) 35.5 °C (95.9 °F)   TempSrc: Temporal Temporal Temporal Temporal   SpO2: 93% 93% 91% 91%   Weight:       Height:           Physical Exam:    GEN: Awake and alert. Not in acute distress.   HEENT: PERRLA .mucous membranes moist.  Clear sclera, PERRL, EOMI.  CARDIO: Regular rate and rhythm.  No murmurs, rubs, or gallops. No LE edema  RESP: Clear to auscultation b/l. No wheezes, rales or rhonchi. Good respiratory effort.   ABD: +BS x4, soft, non-tender. Not distended. No rebound or guarding.  MSK: left ankle pain  NEURO: A&O X 3. CN II-XII are grossly intact. No focal deficits.   SKIN: Warm and dry, no lesions, no rashes.  PSYCH: Appropriate mood and affect, no hallucinations.     Scheduled medications  Scheduled Medications[1]  Continuous medications  Continuous Medications[2]  PRN medications  PRN Medications[3]     Assessment and Plan:  Trinh Vences is a 79 y.o. female   Assessment & Plan  Left trimalleolar fracture, closed, initial encounter    # Left trimalleolar fracture    Seen and examined  Clinically stable  Pain is better  Mild dizziness  Ortho is following  Conservative treat, indication for surgery  PT OT eval  Social service eval  Oxycodone every 6 hours as needed for pain    Plan to discharge to SNF      # Leukocytosis, reactive    GERD: Omeprazole  Depression: Trazodone  H/o left breast intraductal papilloma s/p excisional biopsy  H/o adenocarcinoma of  appendix s/p R hemicolectomy  H/o RLE DVT s/p thrombectomy and IVC filter        I spent 25 minutes in the professional and overall care of this patient. More than 50% of this time was spent examining and counseling patient, reviewing plan of care, and coordinating medical care.    Rhea Marcial MD  Senior Attending Physician  [facility]  Clinical Instructor             [1] aspirin, 81 mg, oral, BID  cyanocobalamin, 1,000 mcg, oral, Daily  oxybutynin, 5 mg, oral, BID  pantoprazole, 20 mg, oral, Daily before breakfast  traZODone, 50 mg, oral, Nightly     [2]    [3] PRN medications: acetaminophen **OR** [DISCONTINUED] acetaminophen **OR** [DISCONTINUED] acetaminophen, hydrALAZINE, melatonin, morphine, morphine, oxyCODONE, polyethylene glycol

## 2025-05-02 NOTE — CARE PLAN
The patient's goals for the shift include no goals stated    The clinical goals for the shift include Pt will remain safe and comfortable during the shift      Problem: Pain - Adult  Goal: Verbalizes/displays adequate comfort level or baseline comfort level  5/1/2025 2153 by Kelsey Graham RN  Outcome: Progressing  5/1/2025 2153 by Kelsey Graham RN  Outcome: Progressing  5/1/2025 2153 by Kelsey Graham RN  Outcome: Progressing     Problem: Safety - Adult  Goal: Free from fall injury  5/1/2025 2153 by Kelsey Graham RN  Outcome: Progressing  5/1/2025 2153 by Kelsey Graham RN  Outcome: Progressing  5/1/2025 2153 by Kelsey Graham RN  Outcome: Progressing     Problem: Discharge Planning  Goal: Discharge to home or other facility with appropriate resources  5/1/2025 2153 by Kelsey Graham RN  Outcome: Progressing  5/1/2025 2153 by Kelsey Graham RN  Outcome: Progressing  5/1/2025 2153 by Kelsey Graham RN  Outcome: Progressing     Problem: Chronic Conditions and Co-morbidities  Goal: Patient's chronic conditions and co-morbidity symptoms are monitored and maintained or improved  5/1/2025 2153 by Kelsey Graham RN  Outcome: Progressing  5/1/2025 2153 by Kelsey Graham RN  Outcome: Progressing  5/1/2025 2153 by Kelsey Graham RN  Outcome: Progressing     Problem: Nutrition  Goal: Nutrient intake appropriate for maintaining nutritional needs  5/1/2025 2153 by Kelsey Graham RN  Outcome: Progressing  5/1/2025 2153 by Kelsey Graham RN  Outcome: Progressing  5/1/2025 2153 by Kelsey Graham RN  Outcome: Progressing     Problem: Diabetes  Goal: Achieve decreasing blood glucose levels by end of shift  5/1/2025 2153 by Kelsey Graham RN  Outcome: Progressing  5/1/2025 2153 by Kelsey Graham RN  Outcome: Progressing  5/1/2025 2153 by Kelsey Graham RN  Outcome: Progressing  Goal: Increase stability of blood glucose readings by end of shift  5/1/2025 2153 by Kelsey Graham RN  Outcome: Progressing  5/1/2025 2153 by Kelsey  LUCIANO Graham  Outcome: Progressing  5/1/2025 2153 by Kelsey Graham RN  Outcome: Progressing  Goal: Decrease in ketones present in urine by end of shift  5/1/2025 2153 by Kelsey Graham RN  Outcome: Progressing  5/1/2025 2153 by Kelsey Graham RN  Outcome: Progressing  5/1/2025 2153 by Kelsey Graham RN  Outcome: Progressing  Goal: Maintain electrolyte levels within acceptable range throughout shift  5/1/2025 2153 by Kelsey Graham RN  Outcome: Progressing  5/1/2025 2153 by Kelsey Graham RN  Outcome: Progressing  5/1/2025 2153 by Kelsey Graham RN  Outcome: Progressing  Goal: Maintain glucose levels >70mg/dl to <250mg/dl throughout shift  5/1/2025 2153 by Kelsey Graham RN  Outcome: Progressing  5/1/2025 2153 by Kelsey Graham RN  Outcome: Progressing  5/1/2025 2153 by Kelsey Graham RN  Outcome: Progressing  Goal: No changes in neurological exam by end of shift  5/1/2025 2153 by Kelsey Graham RN  Outcome: Progressing  5/1/2025 2153 by Kelsey Graham RN  Outcome: Progressing  5/1/2025 2153 by Kelsey Graham RN  Outcome: Progressing  Goal: Learn about and adhere to nutrition recommendations by end of shift  5/1/2025 2153 by Kelsey Graham RN  Outcome: Progressing  5/1/2025 2153 by Kelsey Graham RN  Outcome: Progressing  5/1/2025 2153 by Kelsey Graham RN  Outcome: Progressing  Goal: Vital signs within normal range for age by end of shift  5/1/2025 2153 by Kelsey Graham RN  Outcome: Progressing  5/1/2025 2153 by Kelsey Graham RN  Outcome: Progressing  5/1/2025 2153 by Kelsey Graham RN  Outcome: Progressing  Goal: Increase self care and/or family involovement by end of shift  5/1/2025 2153 by Kelsey Graham RN  Outcome: Progressing  5/1/2025 2153 by Kelsey Graham RN  Outcome: Progressing  5/1/2025 2153 by Kelsey Graham RN  Outcome: Progressing  Goal: Receive DSME education by end of shift  5/1/2025 2153 by Kelsey Graham RN  Outcome: Progressing  5/1/2025 2153 by Kelsey Graham RN  Outcome: Progressing  5/1/2025  2153 by Kelsey Graham RN  Outcome: Progressing     Problem: Pain  Goal: Takes deep breaths with improved pain control throughout the shift  5/1/2025 2153 by Kelsey Graham RN  Outcome: Progressing  5/1/2025 2153 by Kelsey Graham RN  Outcome: Progressing  5/1/2025 2153 by Kelsey Graham RN  Outcome: Progressing  Goal: Turns in bed with improved pain control throughout the shift  5/1/2025 2153 by Kelsey Graham RN  Outcome: Progressing  5/1/2025 2153 by Kelsey Graham RN  Outcome: Progressing  5/1/2025 2153 by Kelsey Graham RN  Outcome: Progressing  Goal: Walks with improved pain control throughout the shift  5/1/2025 2153 by Kelsey Graham RN  Outcome: Progressing  5/1/2025 2153 by Kelsey Graham RN  Outcome: Progressing  5/1/2025 2153 by Kelsey Graham RN  Outcome: Progressing  Goal: Performs ADL's with improved pain control throughout shift  5/1/2025 2153 by Kelsey Graham RN  Outcome: Progressing  5/1/2025 2153 by Kelsey Graham RN  Outcome: Progressing  5/1/2025 2153 by Kelsey Graham RN  Outcome: Progressing  Goal: Participates in PT with improved pain control throughout the shift  5/1/2025 2153 by Kelsey Graham RN  Outcome: Progressing  5/1/2025 2153 by Kelsey Graham RN  Outcome: Progressing  5/1/2025 2153 by Kelsey Graham RN  Outcome: Progressing  Goal: Free from opioid side effects throughout the shift  5/1/2025 2153 by Kelsey Graham RN  Outcome: Progressing  5/1/2025 2153 by Kelsey Graham RN  Outcome: Progressing  5/1/2025 2153 by Kelsey Graham RN  Outcome: Progressing  Goal: Free from acute confusion related to pain meds throughout the shift  5/1/2025 2153 by Kelsey Graham, LUCIANO  Outcome: Progressing  5/1/2025 2153 by Kelsey Graham RN  Outcome: Progressing  5/1/2025 2153 by Kelsey Graham RN  Outcome: Progressing     Problem: Skin  Goal: Participates in plan/prevention/treatment measures  5/1/2025 2153 by Kelsey Graham RN  Outcome: Progressing  5/1/2025 2153 by Kelsey Graham RN  Outcome:  Progressing  5/1/2025 2153 by Kelsey Graham RN  Outcome: Progressing  5/1/2025 2153 by Kelsey Graham RN  Flowsheets (Taken 5/1/2025 2153)  Participates in plan/prevention/treatment measures: Elevate heels  Goal: Prevent/manage excess moisture  5/1/2025 2153 by Kelsey Graham RN  Outcome: Progressing  5/1/2025 2153 by Kelsey Graham RN  Outcome: Progressing  5/1/2025 2153 by Kelsey Graham RN  Outcome: Progressing  5/1/2025 2153 by Kelsey Graham RN  Flowsheets (Taken 5/1/2025 2153)  Prevent/manage excess moisture: Moisturize dry skin  Goal: Prevent/minimize sheer/friction injuries  5/1/2025 2153 by Kelsey Graham RN  Outcome: Progressing  5/1/2025 2153 by Kelsey Graham RN  Outcome: Progressing  5/1/2025 2153 by Kelsey Graham RN  Outcome: Progressing  5/1/2025 2153 by Kelsey Graham RN  Flowsheets (Taken 5/1/2025 2153)  Prevent/minimize sheer/friction injuries: Use pull sheet  Goal: Promote/optimize nutrition  5/1/2025 2153 by Kelsey Graham RN  Outcome: Progressing  5/1/2025 2153 by Kelsey Graham RN  Outcome: Progressing  5/1/2025 2153 by Kelsey Graham RN  Outcome: Progressing  5/1/2025 2153 by Kelsey Graham RN  Flowsheets (Taken 5/1/2025 2153)  Promote/optimize nutrition:   Monitor/record intake including meals   Offer water/supplements/favorite foods  Goal: Promote skin healing  5/1/2025 2153 by Kelsey Graham RN  Outcome: Progressing  5/1/2025 2153 by Kelsey Graham RN  Outcome: Progressing  5/1/2025 2153 by Kelsey Graham RN  Outcome: Progressing  5/1/2025 2153 by Kelsey Graham RN  Flowsheets (Taken 5/1/2025 2153)  Promote skin healing: Assess skin/pad under line(s)/device(s)

## 2025-05-02 NOTE — CARE PLAN
The patient's goals for the shift include pain control, safety and comfort    The clinical goals for the shift include breast comfort

## 2025-05-02 NOTE — PROGRESS NOTES
Occupational Therapy    Evaluation    Patient Name: Trinh Vences  MRN: [id number]  Today's Date: 5/2/2025  Time Calculation  Start Time: 0958  Stop Time: 1016  Time Calculation (min): 18 min  702/702-A    Assessment  IP OT Assessment  OT Assessment: This hospitalization 4/30/2025:  left ankle pain and swelling. HPI:  walking down the stairs when she missed a step and fell twisting her left ankle.  Patient would benefit from further OT to address ADL's and functional transfers/mobility due to high risk for further falls and significant decline in baseline function.  Prognosis: Good  End of Session Communication: Bedside nurse  End of Session Patient Position: Up in chair, Alarm off, not on at start of session; call-light within reach    Plan:  Treatment Interventions: ADL retraining, Functional transfer training, Patient/family training, Equipment evaluation/education, Compensatory technique education, Endurance training (left ankle/LE NWB (toe-touch))  OT Frequency: 3 times per week  OT Discharge Recommendations: Moderate intensity level of continued care  OT - OK to Discharge: Yes to next level of care when medically cleared by physician/medical team    Subjective   Current Problem:  1. Left trimalleolar fracture, closed, initial encounter        2. Fall, initial encounter        3. Closed trimalleolar fracture of left ankle, initial encounter          General:  General  Reason for Referral: ADL, safety assessment  Referred By: Luis Felipe Thibodeaux DO  Past Medical History Relevant to Rehab: depression, OAB, osteoporosis, GERD, L breast intraductal papilloma s/p excisional bx, hx adenocarcinoma of appendix s/p right hemicolectomy, hx RLE DVT s/p thrombectomy & IVC filter, who presents with left ankle pain and swelling. Pt was walking down the stairs when she missed a step and fell twisting her left ankle.  Family/Caregiver Present: Yes ()  Co-Treatment: PT Co-eval to maximize safety during mobility tasks  Prior to  Session Communication: Bedside nurse who confirmed that patient is medically stable to participate in this OT session  Patient Position Received: Up in chair, Alarm off, not on at start of session  General Comment: Patient seen bedside; cooperative, motivated, pleasant    Precautions:  Medical Precautions: Fall precautions  Precautions Comment: Left ankle fracture, non-weightbearing (toe-touch) left lower extremity    Pain:  Pain Assessment  Pain Assessment: 0-10  0-10 (Numeric) Pain Score: 4  Pain Type: Acute pain (cramping)  Pain Location: Ankle  Pain Orientation: Left  Pain Interventions: Repositioned    Objective   Cognition:  Overall Cognitive Status: Within Functional Limits     Home Living:  Type of Home: House  Lives With: Spouse  Home Adaptive Equipment: None  Home Layout: One level, Laundry in basement  Home Access:  (3 entry in own home; plans to stay while recovering with daughter who works remote:  threshold step to enter)  Bathroom Shower/Tub: Walk-in shower (recently remodeled)  Bathroom Toilet: Standard (with sink nearby)  Home Living Comments: sleeps in regular bed     Prior Function:  Level of [place]: Independent with ADLs and functional transfers, Independent with homemaking with ambulation (except family cleans floors)  Ambulatory Assistance: Independent  Hand Dominance: Right  Prior Function Comments: drives, shops; no history of falls    ADL:  LE Dressing Assistance: Total (estimate due to pain and limited WB for LLE)  ADL Comments: To further address self-care tasks in OT sessions using assistive techniques/adaptive equipment to promote indep. and ease in performance    Activity Tolerance:  Endurance: Decreased tolerance for upright activites    Bed Mobility/Transfers:  Required step-by-step instructions/cues for all mobility tasks to promote safety in order to adhere to WB status    Bed Mobility  Bed Mobility: No  Transfers  Transfer: Yes  Transfer 1  Transfer From 1: Sit to, Stand  to  Transfer to 1: Chair with arms  Transfer Device 1: Walker, Gait belt  Transfer Level of Assistance 1: Minimum assistance, +2, Moderate verbal cues    Ambulation/Gait Training:  Functional Mobility  Functional Mobility Performed: Yes  Functional Mobility 1  Device 1: Rolling walker  Functional Mobility Support Devices: Gait belt  Assistance 1: Minimum assistance, Moderate verbal cues (of 2 staff)  Comments 1: ambulated steps forward/backward    Sitting Balance:  Static Sitting Balance  Static Sitting-Level of Assistance: Independent    Standing Balance:  Static Standing Balance  Static Standing-Level of Assistance: Minimum assistance (of 2)  Static Standing-Comment/Number of Minutes: one minute:  initially complained of 'wooziness'    Sensation:  Light Touch: No apparent deficits    Extremities: RUE   RUE : Within Functional Limits and LUE   LUE: Within Functional Limits    Outcome Measures: [facility] Daily Activity  Putting on and taking off regular lower body clothing: Total  Bathing (including washing, rinsing, drying): A lot  Putting on and taking off regular upper body clothing: A little  Toileting, which includes using toilet, bedpan or urinal: Total  Taking care of personal grooming such as brushing teeth: A little  Eating Meals: None  Daily Activity - Total Score: 14     EDUCATION:  Education Documentation  Precautions, taught by Nakul Tejeda OT at 5/2/2025  1:13 PM.  Learner: Patient  Readiness: Acceptance  Method: Explanation  Response: Verbalizes Understanding, Demonstrated Understanding, Needs Reinforcement    Mobility Training, taught by Nakul Tejeda OT at 5/2/2025  1:13 PM.  Learner: Patient  Readiness: Acceptance  Method: Explanation  Response: Verbalizes Understanding, Demonstrated Understanding, Needs Reinforcement    Goals:   Encounter Problems       Encounter Problems (Active)       OT Goals       Patient will complete upper body bathing/dressing with SBA with modified independence using  assistive techniques/adaptive equipment as needed  (Progressing)       Start:  05/02/25    Expected End:  05/16/25            Patient will perform bed mobility and functional transfers safely and independently: bed, chair, commode using DME as needed  (Progressing)       Start:  05/02/25    Expected End:  05/16/25            Patient will tolerate standing for 5 mins. and show overall good (-) standing balance during ADL's and functional transfers/mobility  (Progressing)       Start:  05/02/25    Expected End:  05/16/25            Patient will adhere left ankle/LE NWB (toe-touch)o TKR precautions to ADL's and functional transfers/mobility  (Progressing)       Start:  05/02/25    Expected End:  05/16/25

## 2025-05-02 NOTE — PROGRESS NOTES
Physical Therapy    Physical Therapy Treatment    Patient Name: Trinh Vences  MRN: [id number]  Today's Date: 5/2/2025  Time Calculation  Start Time: 0957  Stop Time: 1016  Time Calculation (min): 19 min     702/702-A    Assessment/Plan   PT Assessment  PT Assessment Results: Decreased endurance, Impaired balance, Decreased mobility, Decreased safety awareness, Orthopedic restrictions  Rehab Prognosis: Good  Barriers to Discharge Home: Caregiver assistance, Physical needs  Caregiver Assistance: Caregiver assistance needed per identified barriers - however, level of patient's required assistance exceeds assistance available at home  Physical Needs: Ambulating household distances limited by function/safety, Stair navigation into home limited by function/safety  Evaluation/Treatment Tolerance: Patient limited by fatigue, Patient limited by pain  Medical Staff Made Aware: Yes  End of Session Communication: Bedside nurse  Assessment Comment:  (Pt continues to be agreeable for skilled therapy services. Pt /c improved tolerance for short distance gait trial; however /c difficulty maintaining TTWB. Continued assist x2 for safety required.)  End of Session Patient Position: Up in chair, Alarm off, not on at start of session     PT Plan  Treatment/Interventions: Bed mobility, Transfer training, Gait training, Stair training, Balance training, Therapeutic activity, Therapeutic exercise, Wheelchair management  PT Plan: Ongoing PT  PT Frequency: 5 times per week  PT Discharge Recommendations: High intensity level of continued care  PT Recommended Transfer Status: Assist x2  PT - OK to Discharge: Yes (to next level of care when cleared by medical team)    Current Problem:  Problem List[1]    General Visit Information:   PT  Visit  PT Received On: 05/02/25  Response to Previous Treatment: Patient with no complaints from previous session.  General  Family/Caregiver Present: Yes  Caregiver Feedback: spouse present  Co-Treatment:  OT  Co-Treatment Reason: possible two person assist, maximize functional mobility  Prior to Session Communication: Bedside nurse  Patient Position Received: Up in chair, Alarm off, not on at start of session  General Comment:  (Pt pleasant, eager to participate. Pt states her and her  agree she will need more therapy before being able to manage at home.)  Subjective     Precautions:  Precautions  Precautions Comment: fall, Ambulation, toe-touch on left with walker.   Encourage range of motion all limbs including isometrics within splint left ankle       Objective     Pain:  Pain Assessment  Pain Assessment: 0-10  0-10 (Numeric) Pain Score: 4 (pt c/o L ankle pain)  Pain Interventions: Repositioned, Elevated  Response to Interventions: Content/relaxed    Cognition:  Cognition  Overall Cognitive Status: Within Functional Limits      Activity Tolerance:  Activity Tolerance  Endurance: Decreased tolerance for upright activites    Treatments:       Ambulation/Gait Training  Ambulation/Gait Training Performed:  (Pt ambulates 3'x2 WW, minAx2. Step to gait, downward gaze, assist for walker management. Difficulty adhering to WB restriction, therefore limiting distance at this time. Pt also c/o feeling 'woozy' without improvement. Improved /c seated rest. Nrsg aware)  Transfers  Transfer:  (sit-stand x2 trials /c minAx2. VCs for UE placement and maintaining WB restriction; fair return only.)          Outcome Measures:     [facility] Basic Mobility  Turning from your back to your side while in a flat bed without using bedrails: None  Moving from lying on your back to sitting on the side of a flat bed without using bedrails: A little  Moving to and from bed to chair (including a wheelchair): A lot  Standing up from a chair using your arms (e.g. wheelchair or bedside chair): A lot  To walk in hospital room: A lot  Climbing 3-5 steps with railing: Total  Basic Mobility - Total Score: 14              Education  Documentation  Mobility Training, taught by Analia Tai, PT at 5/2/2025  2:39 PM.  Learner: Family, Patient  Readiness: Acceptance  Method: Explanation  Response: Verbalizes Understanding, Needs Reinforcement  Comment: PT POC, WB    Education Comments  No comments found.           EDUCATION:     Encounter Problems       Encounter Problems (Active)       PT Problem       STG - Pt will transition supine <> sitting with supervision  (Progressing)       Start:  05/01/25    Expected End:  05/15/25            STG - Pt will transfer STS with sba maintaining lle TTWB  (Progressing)       Start:  05/01/25    Expected End:  05/15/25            STG - Pt will amb >=10' using ww maintaining LLE TTWB with cga  (Progressing)       Start:  05/01/25    Expected End:  05/15/25            STG -  Pt will navigate a threshold step simulating home set up using ww maintaining LLE TTWB with min assist x1  (Progressing)       Start:  05/01/25    Expected End:  05/15/25            STG-Pt will self propel WC on level surface using ue's & rle >=40ft w/ sba (Progressing)       Start:  05/01/25    Expected End:  05/15/25                        [1]   Patient Active Problem List  Diagnosis    Abnormal mammogram    Abnormal ultrasound of breast    Acute bronchitis    Acute left lower quadrant pain    Acute UTI    Axillary adenopathy    Back pain    Low back pain, episodic    Chronic GERD    Elevated blood sugar level    Fibrocystic changes of left breast    Fibrocystic changes of right breast    Frequency of micturition    Heel pain    History of depression    Intraductal papilloma of left breast    Left flank pain    Left groin pain    Cancer of appendix (Multi)    Neuralgia    Peripheral neuropathy    Plantar fasciitis, left    Presence of IVC filter    Recurrent major depressive disorder    Right ankle swelling    Right leg DVT (Multi)    Tiredness    NARCISO (generalized anxiety disorder)    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    Left  trimalleolar fracture, closed, initial encounter

## 2025-05-03 VITALS
HEART RATE: 80 BPM | OXYGEN SATURATION: 93 % | TEMPERATURE: 98.1 F | SYSTOLIC BLOOD PRESSURE: 158 MMHG | DIASTOLIC BLOOD PRESSURE: 69 MMHG | HEIGHT: 66 IN | WEIGHT: 150 LBS | BODY MASS INDEX: 24.11 KG/M2 | RESPIRATION RATE: 18 BRPM

## 2025-05-03 PROCEDURE — 99239 HOSP IP/OBS DSCHRG MGMT >30: CPT | Performed by: INTERNAL MEDICINE

## 2025-05-03 PROCEDURE — 2500000001 HC RX 250 WO HCPCS SELF ADMINISTERED DRUGS (ALT 637 FOR MEDICARE OP): Performed by: INTERNAL MEDICINE

## 2025-05-03 RX ORDER — ACETAMINOPHEN 325 MG/1
650 TABLET ORAL 2 TIMES DAILY
Start: 2025-05-03

## 2025-05-03 RX ORDER — OXYCODONE HYDROCHLORIDE 5 MG/1
5 TABLET ORAL EVERY 6 HOURS PRN
Start: 2025-05-03

## 2025-05-03 RX ORDER — POLYETHYLENE GLYCOL 3350 17 G/17G
17 POWDER, FOR SOLUTION ORAL DAILY PRN
Start: 2025-05-03

## 2025-05-03 RX ORDER — TALC
3 POWDER (GRAM) TOPICAL NIGHTLY PRN
Start: 2025-05-03

## 2025-05-03 RX ORDER — NAPROXEN SODIUM 220 MG/1
325 TABLET, FILM COATED ORAL 2 TIMES DAILY
Status: DISCONTINUED | OUTPATIENT
Start: 2025-05-03 | End: 2025-05-03 | Stop reason: HOSPADM

## 2025-05-03 RX ORDER — NAPROXEN SODIUM 220 MG/1
325 TABLET, FILM COATED ORAL 2 TIMES DAILY
Start: 2025-05-03

## 2025-05-03 RX ADMIN — Medication 1000 MCG: at 08:52

## 2025-05-03 ASSESSMENT — PAIN SCALES - GENERAL: PAINLEVEL_OUTOF10: 0 - NO PAIN

## 2025-05-03 NOTE — PROGRESS NOTES
Patient has written discharge order. Preert obtained for [facility]. Transport arranged for 2pm. Nurse and patient notified. Questions and concerns answered.

## 2025-05-03 NOTE — CARE PLAN
Problem: Pain - Adult  Goal: Verbalizes/displays adequate comfort level or baseline comfort level  Outcome: Progressing     Problem: Safety - Adult  Goal: Free from fall injury  Outcome: Progressing     Problem: Discharge Planning  Goal: Discharge to home or other facility with appropriate resources  Outcome: Progressing     Problem: Chronic Conditions and Co-morbidities  Goal: Patient's chronic conditions and co-morbidity symptoms are monitored and maintained or improved  Outcome: Progressing     Problem: Nutrition  Goal: Nutrient intake appropriate for maintaining nutritional needs  Outcome: Progressing     Problem: Diabetes  Goal: Achieve decreasing blood glucose levels by end of shift  Outcome: Progressing  Goal: Increase stability of blood glucose readings by end of shift  Outcome: Progressing  Goal: Decrease in ketones present in urine by end of shift  Outcome: Progressing  Goal: Maintain electrolyte levels within acceptable range throughout shift  Outcome: Progressing  Goal: Maintain glucose levels >70mg/dl to <250mg/dl throughout shift  Outcome: Progressing  Goal: No changes in neurological exam by end of shift  Outcome: Progressing  Goal: Learn about and adhere to nutrition recommendations by end of shift  Outcome: Progressing  Goal: Vital signs within normal range for age by end of shift  Outcome: Progressing  Goal: Increase self care and/or family involovement by end of shift  Outcome: Progressing  Goal: Receive DSME education by end of shift  Outcome: Progressing     Problem: Pain  Goal: Takes deep breaths with improved pain control throughout the shift  Outcome: Progressing  Goal: Turns in bed with improved pain control throughout the shift  Outcome: Progressing  Goal: Walks with improved pain control throughout the shift  Outcome: Progressing  Goal: Performs ADL's with improved pain control throughout shift  Outcome: Progressing  Goal: Participates in PT with improved pain control throughout the  shift  Outcome: Progressing  Goal: Free from opioid side effects throughout the shift  Outcome: Progressing  Goal: Free from acute confusion related to pain meds throughout the shift  Outcome: Progressing     Problem: Skin  Goal: Participates in plan/prevention/treatment measures  Outcome: Progressing  Flowsheets (Taken 5/3/2025 1434)  Participates in plan/prevention/treatment measures: Elevate heels  Goal: Prevent/manage excess moisture  Outcome: Progressing  Flowsheets (Taken 5/3/2025 1434)  Prevent/manage excess moisture: Monitor for/manage infection if present  Goal: Prevent/minimize sheer/friction injuries  Outcome: Progressing  Flowsheets (Taken 5/3/2025 1434)  Prevent/minimize sheer/friction injuries: Use pull sheet  Goal: Promote/optimize nutrition  Outcome: Progressing  Flowsheets (Taken 5/3/2025 1434)  Promote/optimize nutrition: Monitor/record intake including meals  Goal: Promote skin healing  Outcome: Progressing   The patient's goals for the shift include pain control, safety and comfort    The clinical goals for the shift include remain HD stable

## 2025-05-03 NOTE — CARE PLAN
The patient's goals for the shift include none stated    The clinical goals for the shift include remain HD stable      Problem: Pain - Adult  Goal: Verbalizes/displays adequate comfort level or baseline comfort level  Outcome: Progressing     Problem: Safety - Adult  Goal: Free from fall injury  Outcome: Progressing     Problem: Discharge Planning  Goal: Discharge to home or other facility with appropriate resources  Outcome: Progressing     Problem: Chronic Conditions and Co-morbidities  Goal: Patient's chronic conditions and co-morbidity symptoms are monitored and maintained or improved  Outcome: Progressing     Problem: Nutrition  Goal: Nutrient intake appropriate for maintaining nutritional needs  Outcome: Progressing     Problem: Diabetes  Goal: Achieve decreasing blood glucose levels by end of shift  Outcome: Progressing  Goal: Increase stability of blood glucose readings by end of shift  Outcome: Progressing  Goal: Decrease in ketones present in urine by end of shift  Outcome: Progressing  Goal: Maintain electrolyte levels within acceptable range throughout shift  Outcome: Progressing  Goal: Maintain glucose levels >70mg/dl to <250mg/dl throughout shift  Outcome: Progressing  Goal: No changes in neurological exam by end of shift  Outcome: Progressing  Goal: Learn about and adhere to nutrition recommendations by end of shift  Outcome: Progressing  Goal: Vital signs within normal range for age by end of shift  Outcome: Progressing  Goal: Increase self care and/or family involovement by end of shift  Outcome: Progressing  Goal: Receive DSME education by end of shift  Outcome: Progressing     Problem: Pain  Goal: Takes deep breaths with improved pain control throughout the shift  Outcome: Progressing  Goal: Turns in bed with improved pain control throughout the shift  Outcome: Progressing  Goal: Walks with improved pain control throughout the shift  Outcome: Progressing  Goal: Performs ADL's with improved pain  control throughout shift  Outcome: Progressing  Goal: Participates in PT with improved pain control throughout the shift  Outcome: Progressing  Goal: Free from opioid side effects throughout the shift  Outcome: Progressing  Goal: Free from acute confusion related to pain meds throughout the shift  Outcome: Progressing     Problem: Skin  Goal: Participates in plan/prevention/treatment measures  Outcome: Progressing  Goal: Prevent/manage excess moisture  Outcome: Progressing  Goal: Prevent/minimize sheer/friction injuries  Outcome: Progressing  Goal: Promote/optimize nutrition  Outcome: Progressing  Goal: Promote skin healing  Outcome: Progressing

## 2025-05-03 NOTE — DISCHARGE SUMMARY
Discharge Diagnosis  Left trimalleolar fracture, closed, initial encounter           Issues Requiring Follow-Up  Ortho follow up in 1 to 2 weeks for further evaluation.     Discharge Meds     Medication List      START taking these medications     acetaminophen 325 mg tablet; Commonly known as: Tylenol; Take 2 tablets   (650 mg) by mouth 2 times a day.   aspirin 81 mg chewable tablet; Chew 4 tablets (324 mg) 2 times a day. On   high dose for DVT prophylaxis.; Replaces: aspirin 81 mg EC tablet   melatonin 3 mg tablet; Take 1 tablet (3 mg) by mouth as needed at   bedtime for sleep.   oxyCODONE 5 mg immediate release tablet; Commonly known as: Roxicodone;   Take 1 tablet (5 mg) by mouth every 6 hours if needed for severe pain (7 -   10).   polyethylene glycol 17 gram packet; Commonly known as: Glycolax,   Miralax; Take 17 g by mouth once daily as needed (constipation.).     CONTINUE taking these medications     alendronate 70 mg tablet; Commonly known as: Fosamax; Take 1 tablet (70   mg) by mouth every 7 days. Take in the morning with a full glass of water,   on an empty stomach, and do not take anything else by mouth or lie down   for the next 30 min.   calcium carbonate 500 mg (200 mg elemental) chewable tablet; Commonly   known as: Tums   cholecalciferol (vitamin D3) 250 mcg (10,000 unit) capsule   cyanocobalamin 1,000 mcg tablet; Commonly known as: Vitamin B-12   estradiol 0.01 % (0.1 mg/gram) vaginal cream; Commonly known as: Estrace   Fish OiL 1,000 (120-180) mg capsule; Generic drug: omega 3-dha-epa-fish   oil   FISH OIL ORAL   MAGNESIUM GLYCINATE ORAL   Myrbetriq 25 mg tablet extended release 24 hr; Generic drug: mirabegron   omeprazole 20 mg DR capsule; Commonly known as: PriLOSEC; TAKE 1 CAPSULE   BY MOUTH ONCE  DAILY IN THE MORNING BEFORE A  MEAL   traZODone 50 mg tablet; Commonly known as: Desyrel; TAKE 1 TABLET BY   MOUTH ONCE  DAILY AT BEDTIME     STOP taking these medications     aspirin 81 mg EC tablet;  Replaced by: aspirin 81 mg chewable tablet   methocarbamol 500 mg tablet; Commonly known as: Robaxin       Test Results Pending At Discharge  Pending Labs       No current pending labs.            Hospital Course  79 y.o. female with depression, OAB, osteoporosis, GERD, L breast intraductal papilloma s/p excisional bx, hx adenocarcinoma of appendix s/p right hemicolectomy, hx RLE DVT s/p thrombectomy & IVC filter, who was presented to [facility] ED with left ankle pain and swelling. She was walking down the stairs when she missed a step and fell twisting her left ankle.  X-ray showed trimalleolar fracture with diffuse soft tissue swelling above the ankle.     Issues addressed during the stay:  # Left trimalleolar fracture  Evaluated by orthopedics,   intra-articular fracture component, with very minimal displacement.  Well-maintained ankle mortise.   Conservative treatment, no immediate indication for surgery  NWB on heel, toe touch weight bearing,   PT OT eval  Aspirin 325 mg BID for dvt prophylaxis  Tylenol 650 mg BID, scheduled,   Oxycodone 5 mg Q 6hrs PRN for severe pain,   Follow up with ortho in 1 week.      # Leukocytosis, reactive    Other chronic issues;  GERD: Omeprazole  Depression: Trazodone  H/o left breast intraductal papilloma s/p excisional biopsy  H/o adenocarcinoma of appendix s/p R hemicolectomy  H/o RLE DVT s/p thrombectomy and IVC filter      PT, OT advised  moderate intensity therapy.     Discharged to SNF in a stable condition.  Discharge day management time > 30 minutes.          Pertinent Physical Exam At Time of Discharge:  Vitals:    05/03/25 0733   BP: 158/69   Pulse: 80   Resp: 18   Temp: 36.7 °C (98.1 °F)   SpO2: 93%       Physical Exam  Constitutional:       Appearance: Normal appearance.   HENT:      Head: Normocephalic.      Nose: Nose normal.      Mouth/Throat:      Mouth: Mucous membranes are moist.   Eyes:      Extraocular Movements: Extraocular movements intact.      Pupils: Pupils  are equal, round, and reactive to light.   Cardiovascular:      Rate and Rhythm: Normal rate and regular rhythm.      Pulses: Normal pulses.      Heart sounds: Normal heart sounds.   Pulmonary:      Effort: No respiratory distress.      Breath sounds: Normal breath sounds. No wheezing.   Abdominal:      General: There is no distension.      Palpations: Abdomen is soft.      Tenderness: There is no abdominal tenderness.   Musculoskeletal:         General: Normal range of motion.      Cervical back: Normal range of motion.      Comments: Left ankle swelling noted, movements restricted, painful,    Skin:     General: Skin is warm.   Neurological:      General: No focal deficit present.      Mental Status: She is alert and oriented to person, place, and time. Mental status is at baseline.   Psychiatric:         Mood and Affect: Mood normal.         Outpatient Follow-Up  Future Appointments   Date Time Provider Department Center   6/9/2025  1:30 PM Gage Angela MD [id number] Bang Gayle MD

## 2025-05-06 ENCOUNTER — LAB REQUISITION (OUTPATIENT)
Dept: LAB | Facility: HOSPITAL | Age: 80
End: 2025-05-06
Payer: MEDICARE

## 2025-05-06 DIAGNOSIS — F41.9 ANXIETY DISORDER, UNSPECIFIED: ICD-10-CM

## 2025-05-06 DIAGNOSIS — R73.03 PREDIABETES: ICD-10-CM

## 2025-05-06 DIAGNOSIS — E55.9 VITAMIN D DEFICIENCY, UNSPECIFIED: ICD-10-CM

## 2025-05-06 DIAGNOSIS — F33.1 MAJOR DEPRESSIVE DISORDER, RECURRENT, MODERATE: ICD-10-CM

## 2025-05-06 DIAGNOSIS — M81.0 AGE-RELATED OSTEOPOROSIS WITHOUT CURRENT PATHOLOGICAL FRACTURE: ICD-10-CM

## 2025-05-06 LAB
25(OH)D3 SERPL-MCNC: 42 NG/ML (ref 30–100)
ALBUMIN SERPL BCP-MCNC: 3.6 G/DL (ref 3.4–5)
ALP SERPL-CCNC: 59 U/L (ref 33–136)
ALT SERPL W P-5'-P-CCNC: 14 U/L (ref 7–45)
ANION GAP SERPL CALC-SCNC: 11 MMOL/L (ref 10–20)
AST SERPL W P-5'-P-CCNC: 12 U/L (ref 9–39)
BASOPHILS # BLD AUTO: 0.03 X10*3/UL (ref 0–0.1)
BASOPHILS NFR BLD AUTO: 0.3 %
BILIRUB SERPL-MCNC: 0.8 MG/DL (ref 0–1.2)
BUN SERPL-MCNC: 19 MG/DL (ref 6–23)
CALCIUM SERPL-MCNC: 8.8 MG/DL (ref 8.6–10.3)
CHLORIDE SERPL-SCNC: 105 MMOL/L (ref 98–107)
CO2 SERPL-SCNC: 29 MMOL/L (ref 21–32)
CREAT SERPL-MCNC: 0.52 MG/DL (ref 0.5–1.05)
EGFRCR SERPLBLD CKD-EPI 2021: >90 ML/MIN/1.73M*2
EOSINOPHIL # BLD AUTO: 0.22 X10*3/UL (ref 0–0.4)
EOSINOPHIL NFR BLD AUTO: 2.2 %
ERYTHROCYTE [DISTWIDTH] IN BLOOD BY AUTOMATED COUNT: 13.2 % (ref 11.5–14.5)
EST. AVERAGE GLUCOSE BLD GHB EST-MCNC: 108 MG/DL
FOLATE SERPL-MCNC: 17.4 NG/ML
GLUCOSE SERPL-MCNC: 92 MG/DL (ref 74–99)
HBA1C MFR BLD: 5.4 % (ref ?–5.7)
HCT VFR BLD AUTO: 41.5 % (ref 36–46)
HGB BLD-MCNC: 13.4 G/DL (ref 12–16)
IMM GRANULOCYTES # BLD AUTO: 0.03 X10*3/UL (ref 0–0.5)
IMM GRANULOCYTES NFR BLD AUTO: 0.3 % (ref 0–0.9)
LYMPHOCYTES # BLD AUTO: 1.67 X10*3/UL (ref 0.8–3)
LYMPHOCYTES NFR BLD AUTO: 17 %
MAGNESIUM SERPL-MCNC: 1.93 MG/DL (ref 1.6–2.4)
MCH RBC QN AUTO: 30.3 PG (ref 26–34)
MCHC RBC AUTO-ENTMCNC: 32.3 G/DL (ref 32–36)
MCV RBC AUTO: 94 FL (ref 80–100)
MONOCYTES # BLD AUTO: 0.91 X10*3/UL (ref 0.05–0.8)
MONOCYTES NFR BLD AUTO: 9.2 %
NEUTROPHILS # BLD AUTO: 6.98 X10*3/UL (ref 1.6–5.5)
NEUTROPHILS NFR BLD AUTO: 71 %
NRBC BLD-RTO: 0 /100 WBCS (ref 0–0)
PLATELET # BLD AUTO: 181 X10*3/UL (ref 150–450)
POTASSIUM SERPL-SCNC: 4 MMOL/L (ref 3.5–5.3)
PROT SERPL-MCNC: 5.7 G/DL (ref 6.4–8.2)
RBC # BLD AUTO: 4.42 X10*6/UL (ref 4–5.2)
SODIUM SERPL-SCNC: 141 MMOL/L (ref 136–145)
TSH SERPL-ACNC: 2.77 MIU/L (ref 0.44–3.98)
VIT B12 SERPL-MCNC: 529 PG/ML (ref 211–911)
WBC # BLD AUTO: 9.8 X10*3/UL (ref 4.4–11.3)

## 2025-05-06 PROCEDURE — 36415 COLL VENOUS BLD VENIPUNCTURE: CPT | Mod: OUT | Performed by: INTERNAL MEDICINE

## 2025-05-06 PROCEDURE — 80053 COMPREHEN METABOLIC PANEL: CPT | Mod: OUT | Performed by: INTERNAL MEDICINE

## 2025-05-06 PROCEDURE — 84443 ASSAY THYROID STIM HORMONE: CPT | Mod: OUT | Performed by: INTERNAL MEDICINE

## 2025-05-06 PROCEDURE — 82746 ASSAY OF FOLIC ACID SERUM: CPT | Mod: OUT,PARLAB | Performed by: INTERNAL MEDICINE

## 2025-05-06 PROCEDURE — 82306 VITAMIN D 25 HYDROXY: CPT | Mod: OUT,PARLAB | Performed by: INTERNAL MEDICINE

## 2025-05-06 PROCEDURE — 85025 COMPLETE CBC W/AUTO DIFF WBC: CPT | Mod: OUT | Performed by: INTERNAL MEDICINE

## 2025-05-06 PROCEDURE — 83735 ASSAY OF MAGNESIUM: CPT | Mod: OUT | Performed by: INTERNAL MEDICINE

## 2025-05-06 PROCEDURE — 82607 VITAMIN B-12: CPT | Mod: OUT,PARLAB | Performed by: INTERNAL MEDICINE

## 2025-05-06 PROCEDURE — 83036 HEMOGLOBIN GLYCOSYLATED A1C: CPT | Mod: OUT,PARLAB | Performed by: INTERNAL MEDICINE

## 2025-05-06 NOTE — DOCUMENTATION CLARIFICATION NOTE
"    PATIENT:               GUERITA GARZON  ACCT #:                  [id number]  MRN:                       [id number]  :                       1945  ADMIT DATE:       2025 9:58 PM  DISCH DATE:        5/3/2025 2:53 PM  RESPONDING PROVIDER #:        [id number]          PROVIDER RESPONSE TEXT:    Traumatic fracture - Closed    CDI QUERY TEXT:    Clarification        Instruction:    Based on your assessment of the patient and the clinical information, please provide the requested documentation by clicking on the appropriate radio button and enter any additional information if prompted.    Question: Please further specify the type of fracture and/or site as    When answering this query, please exercise your independent professional judgment. The fact that a question is being asked, does not imply that any particular answer is desired or expected.    The patient's clinical indicators include:  Clinical Information: 78 yo with left ankle fracture    Clinical Indicators: H and P  \"79 y.o. female with depression, OAB, osteoporosis, GERD, L breast intraductal papilloma s/p excisional bx, hx adenocarcinoma of appendix s/p right hemicolectomy, hx RLE DVT s/p thrombectomy & IVC filter, who presents with left ankle pain and swelling. Pt was walking down the stairs when she missed a step and fell twisting her left ankle\", \"Osteoporosis\"    PN 25  Dr. Thibodeaux  \"Admit patient for left trimalleolar fracture.  Pain control with Morphine ordered as needed.  Consulted orthopedic surgery.  No surgical intervention recommended at this time, nonweightbearing (toe-touch) LLE\"    Treatment: Orthopedic consult, PT, OT evaluation, NWB LLE    Risk Factors: 78 yo with osteoporosis, left ankle fx  Options provided:  -- Traumatic fracture - Closed, Please specify additional information below  -- Pathologic fracture  -- Fracture due to combination of pathological and traumatic in which the trauma alone would not have caused the " fracture  -- Other - I will add my own diagnosis  -- Refer to Clinical Documentation Reviewer    Query created by: Noemi Weber on 5/2/2025 10:08 AM      Electronically signed by:  BERNARD GALINDO DO 5/6/2025 7:03 AM

## 2025-05-12 ENCOUNTER — PATIENT OUTREACH (OUTPATIENT)
Dept: PRIMARY CARE | Facility: CLINIC | Age: 80
End: 2025-05-12
Payer: MEDICARE

## 2025-05-12 NOTE — PROGRESS NOTES
Discharge Facility:[facility]  Discharge Diagnosis:Atherosclerotic heart disease of native coronary artery  Admission Date:5/3/2  Discharge Date:5/9/25    Discharge Facility: [facility]  Discharge Diagnosis:Left trimalleolar fracture, closed, initial encounter   Admission Date:5/1/25  Discharge Date: 5/3/25    PCP Appointment Date:Message sent to office  Specialist Appointment Date: Orthopedics 5/14/25  Hospital Encounter and Summary Linked: Yes  See discharge assessment below for further details    ED to Hosp-Admission (Discharged) with Magan Gayle MD; Augustine Morales DO (04/30/2025)   Discharge Summary by Magan Gayle MD (05/03/2025 10:09)     Wrap Up  Wrap Up Additional Comments: Patient is doing well today. She is staying with her daughter because her home is one story and is easier to navigate. She states there were no new medications at discharge. Message sent to pcp office for follow up appointment. She is waiting to hear from [facility] for start of care date and time. No questions or concerns at this time. (5/12/2025 11:58 AM)    Medications  Medications reviewed with patient/caregiver?: Not applicable (5/12/2025 11:58 AM)  Is the patient having any side effects they believe may be caused by any medication additions or changes?: No (5/12/2025 11:58 AM)  Does the patient have all medications ordered at discharge?: Not applicable (5/12/2025 11:58 AM)  Care Management Interventions: Provided patient education (5/12/2025 11:58 AM)  Prescription Comments: No new medications at discharge (5/12/2025 11:58 AM)  Is the patient taking all medications as directed (includes completed medication regime)?: Yes (5/12/2025 11:58 AM)  Care Management Interventions: Provided patient education (5/12/2025 11:58 AM)  Medication Comments: No new medications at discharge (5/12/2025 11:58 AM)    Appointments  Does the patient have a primary care provider?: Yes (5/12/2025 11:58 AM)  Care Management  Interventions: Advised patient to make appointment (5/12/2025 11:58 AM)  Has the patient kept scheduled appointments due by today?: Yes (5/12/2025 11:58 AM)  Care Management Interventions: Advised to reschedule appointment (5/12/2025 11:58 AM)    Self Management  What is the home health agency?: [facility] (5/12/2025 11:58 AM)  Has home health visited the patient within 72 hours of discharge?: Call prior to 72 hours (5/12/2025 11:58 AM)  What Durable Medical Equipment (DME) was ordered?: N/A (5/12/2025 11:58 AM)    Patient Teaching  Does the patient have access to their discharge instructions?: Yes (5/12/2025 11:58 AM)  Care Management Interventions: Reviewed instructions with patient (5/12/2025 11:58 AM)  What is the patient's perception of their health status since discharge?: Improving (5/12/2025 11:58 AM)  Is the patient/caregiver able to teach back the hierarchy of who to call/visit for symptoms/problems? PCP, Specialist, Home Health nurse, Urgent Care, ED, 911: Yes (5/12/2025 11:58 AM)

## 2025-05-14 ENCOUNTER — TELEPHONE (OUTPATIENT)
Dept: PRIMARY CARE | Facility: CLINIC | Age: 80
End: 2025-05-14
Payer: MEDICARE

## 2025-05-14 NOTE — H&P (VIEW-ONLY)
PATIENT ID: Trinh Vences is a 79 y.o. female who presents for Injury of the Left Ankle (DOI 4/30/25, last seen by nando Kaufmanolar fx/PL-3).    History of Present Illness  The patient presents for a 2-week follow-up of her left ankle fracture.    She reports a general sense of well-being but experiences discomfort in her left ankle during sleep, particularly when it is covered with blankets. Prolonged periods of sitting, such as when she elevates her leg on the couch for approximately 2 hours, also result in mild aching. Despite these symptoms, she maintains mobility with the aid of a walker and retains the ability to wiggle her toes.    SOCIAL HISTORY  Exercise: Walks around with a walker            Past Medical History:   Diagnosis Date   • Acid reflux    • Fracture of ankle 04/30/2025    left       Past Surgical History:   Procedure Laterality Date   • APPENDECTOMY     • GALL BLADDER          Current Outpatient Medications on File Prior to Visit   Medication Sig Dispense Refill   • acetaminophen (Tylenol) 325 MG suppository Insert into the rectum     • alendronate (Fosamax) 70 MG/75ML solution Take 70 mg by mouth every 7 (seven) days Take in the morning with a full glass of water, on an empty stomach, and do not take anything else by mouth or lie down for the next 30 min.     • calcium carbonate (Os-Napoleon) 1250 (500 Ca) MG chewable tablet Chew 1 tablet Daily     • cyanocobalamin (Vitamin B-12) 1000 MCG tablet Take 1,000 mcg by mouth Daily     • Enoxaparin Sodium (LOVENOX SC) Inject under the skin     • Melatonin 1 MG capsule Take by mouth     • Mirabegron (MYRBETRIQ PO) Take by mouth     • omeprazole OTC (PriLOSEC OTC) 20 MG EC tablet Take 20 mg by mouth in the morning. Take before meals. Do not crush, chew, or split.     • oxyCODONE (Oxy-IR) 5 MG immediate release capsule Take by mouth     • traZODone (Desyrel) 50 MG tablet Take by mouth at bedtime       No current facility-administered medications on  file prior to visit.        Social Connections: Socially Integrated (5/1/2025)    Received from [facility]    Social Connection and Isolation Panel [NHANES]    • Frequency of Communication with Friends and Family: More than three times a week    • Frequency of Social Gatherings with Friends and Family: Twice a week    • Attends Bahai Services: More than 4 times per year    • Active Member of Clubs or Organizations: Yes    • Attends Club or Organization Meetings: More than 4 times per year    • Marital Status:         Allergies   Allergen Reactions   • Ciprofloxacin         Family History   Family history unknown: Yes        OBJECTIVE:      There were no vitals filed for this visit.    Physical Exam  Neurological: Neurovascular status is intact.    Musculoskeletal:  Left Ankle: Bimalleolar fracture with a small posterior fragment, slight displacement noted. And shift noted compared to 1 wk ago of both the DISTAL FIB AND MEDIAL MAL FX'S       Results  Imaging   - X-ray of the left ankle (AP, lateral, mortise view): Shows a bimalleolar fracture with a small posterior fragment, indicating a trimalleolar ankle fracture on the left side.   And shift noted compared to 1 wk ago of both the DISTAL FIB AND MEDIAL MAL FX'S    Assessment & Plan  1. Left ankle trimalleolar fracture:    ORIF is recommended to address the trimalleolar fracture. Discussed the risks and benefits of the surgery, including potential complications and the recovery process. Surgery is planned to be performed as soon as possible to ensure proper healing and alignment. Advised to continue using the walker for mobility and to avoid prolonged periods of sitting with the foot elevated to minimize discomfort.     Follow-up:   A follow-up appointment will be scheduled post-surgery to monitor healing and progress.    FIRST OFFICE VISIT POST OP XRAYS AP/LAT/MORT LEFT ANKLE OUT OF SPLINT

## 2025-05-14 NOTE — TELEPHONE ENCOUNTER
Zahira from [facility] called to get verbal orders to start skilled nursing, PT, and OT since patient was discharges from [facility] to home.   Call if verbal orders are ok. [phone]

## 2025-05-14 NOTE — PREPROCEDURE INSTRUCTIONS
Current Medications    Medication Instructions    alendronate (Fosamax) 70 mg tablet (Sundays) Take as directed     calcium carbonate (Tums) 200 mg calcium chewable tablet Do not take day of surgery    cholecalciferol, vitamin D3, 250 mcg (10,000 unit) capsule Do not take day of surgery     cyanocobalamin (Vitamin B-12) 1,000 mcg tablet Do not take day of surgery     docosahexaenoic acid/epa (FISH OIL ORAL) Do not take day of surgery     MAGNESIUM GLYCINATE ORAL Do not take day of surgery     omeprazole (PriLOSEC) 20 mg DR capsule *Take morning of surgery with sip of water    oxyCODONE (Roxicodone) 5 mg immediate release tablet Take as directed for pain    traZODone (Desyrel) 50 mg tablet Continue until night before surgery           NPO Instructions:    Do not eat any food after midnight the night before your surgery.  You may have 13 ounces of clear liquids until TWO hours before ARRIVAL to hospital. This includes water, black tea/coffee, (no milk or cream) apple juice and electrolyte drinks (Gatorade).    Additional Instructions:     Day of Surgery: Arrive in registration at 11:00 am for 1:00 pm surgery    Enter through the main entrance of [facility] [place], located at [address]. Proceed to registration, located on the right hand side of the staircase. You will need your ID and insurance card for registration. Please ensure you have a responsible adult to drive you home. A responsible adult DOES NOT include rideshare service drivers (Uber, Lyft, etc), cab drivers, or public transportation drivers, but “provide a ride” is acceptable.    Take a shower before your procedure. After your shower avoid lotions, powders, deodorants or anything applied to the skin. If you wear contacts or glasses, wear the glasses. If you do not have glasses, please bring a case for your contacts. You may wear hearing aids and dentures, bring a case for them or we will provide one. Make sure you wear something loose and  comfortable. Keep in mind your surgical procedure and wear something that will accommodate incisions or bandages. Please remove all jewelry and piercing's.     For further questions Sandy ROSARIO can be contacted at [phone] between 7AM-3PM.

## 2025-05-15 ENCOUNTER — ANESTHESIA EVENT (OUTPATIENT)
Dept: OPERATING ROOM | Facility: HOSPITAL | Age: 80
End: 2025-05-15
Payer: MEDICARE

## 2025-05-15 ENCOUNTER — ANESTHESIA (OUTPATIENT)
Dept: OPERATING ROOM | Facility: HOSPITAL | Age: 80
End: 2025-05-15
Payer: MEDICARE

## 2025-05-15 ENCOUNTER — HOSPITAL ENCOUNTER (OUTPATIENT)
Facility: HOSPITAL | Age: 80
Setting detail: OUTPATIENT SURGERY
Discharge: HOME | End: 2025-05-15
Attending: ORTHOPAEDIC SURGERY | Admitting: ORTHOPAEDIC SURGERY
Payer: MEDICARE

## 2025-05-15 ENCOUNTER — APPOINTMENT (OUTPATIENT)
Dept: RADIOLOGY | Facility: HOSPITAL | Age: 80
End: 2025-05-15
Payer: MEDICARE

## 2025-05-15 ENCOUNTER — PHARMACY VISIT (OUTPATIENT)
Dept: PHARMACY | Facility: CLINIC | Age: 80
End: 2025-05-15
Payer: COMMERCIAL

## 2025-05-15 VITALS
HEART RATE: 82 BPM | SYSTOLIC BLOOD PRESSURE: 168 MMHG | OXYGEN SATURATION: 97 % | RESPIRATION RATE: 16 BRPM | WEIGHT: 149.91 LBS | DIASTOLIC BLOOD PRESSURE: 75 MMHG | BODY MASS INDEX: 24.09 KG/M2 | HEIGHT: 66 IN | TEMPERATURE: 97.3 F

## 2025-05-15 DIAGNOSIS — S82.852A LEFT TRIMALLEOLAR FRACTURE, CLOSED, INITIAL ENCOUNTER: Primary | ICD-10-CM

## 2025-05-15 PROBLEM — I87.1: Status: ACTIVE | Noted: 2025-05-15

## 2025-05-15 PROBLEM — Z85.828 HISTORY OF BASAL CELL CARCINOMA (BCC): Status: ACTIVE | Noted: 2025-05-15

## 2025-05-15 PROBLEM — F32.A DEPRESSIVE DISORDER: Status: ACTIVE | Noted: 2025-05-15

## 2025-05-15 PROCEDURE — 3700000001 HC GENERAL ANESTHESIA TIME - INITIAL BASE CHARGE: Performed by: ORTHOPAEDIC SURGERY

## 2025-05-15 PROCEDURE — 76000 FLUOROSCOPY <1 HR PHYS/QHP: CPT | Mod: 59

## 2025-05-15 PROCEDURE — A27822 PR OPEN TX TRIMALLEOLAR ANKLE FX W/O FIX PST LIP: Performed by: NURSE ANESTHETIST, CERTIFIED REGISTERED

## 2025-05-15 PROCEDURE — 99100 ANES PT EXTEME AGE<1 YR&>70: CPT | Performed by: ANESTHESIOLOGY

## 2025-05-15 PROCEDURE — 2500000004 HC RX 250 GENERAL PHARMACY W/ HCPCS (ALT 636 FOR OP/ED): Mod: JZ | Performed by: ANESTHESIOLOGY

## 2025-05-15 PROCEDURE — 7100000001 HC RECOVERY ROOM TIME - INITIAL BASE CHARGE: Performed by: ORTHOPAEDIC SURGERY

## 2025-05-15 PROCEDURE — RXMED WILLOW AMBULATORY MEDICATION CHARGE

## 2025-05-15 PROCEDURE — 3700000002 HC GENERAL ANESTHESIA TIME - EACH INCREMENTAL 1 MINUTE: Performed by: ORTHOPAEDIC SURGERY

## 2025-05-15 PROCEDURE — 7100000009 HC PHASE TWO TIME - INITIAL BASE CHARGE: Performed by: ORTHOPAEDIC SURGERY

## 2025-05-15 PROCEDURE — 2720000007 HC OR 272 NO HCPCS: Performed by: ORTHOPAEDIC SURGERY

## 2025-05-15 PROCEDURE — 2780000003 HC OR 278 NO HCPCS: Performed by: ORTHOPAEDIC SURGERY

## 2025-05-15 PROCEDURE — 7100000010 HC PHASE TWO TIME - EACH INCREMENTAL 1 MINUTE: Performed by: ORTHOPAEDIC SURGERY

## 2025-05-15 PROCEDURE — 3600000009 HC OR TIME - EACH INCREMENTAL 1 MINUTE - PROCEDURE LEVEL FOUR: Performed by: ORTHOPAEDIC SURGERY

## 2025-05-15 PROCEDURE — 3600000004 HC OR TIME - INITIAL BASE CHARGE - PROCEDURE LEVEL FOUR: Performed by: ORTHOPAEDIC SURGERY

## 2025-05-15 PROCEDURE — 2500000004 HC RX 250 GENERAL PHARMACY W/ HCPCS (ALT 636 FOR OP/ED): Performed by: NURSE ANESTHETIST, CERTIFIED REGISTERED

## 2025-05-15 PROCEDURE — 2500000001 HC RX 250 WO HCPCS SELF ADMINISTERED DRUGS (ALT 637 FOR MEDICARE OP): Performed by: ANESTHESIOLOGY

## 2025-05-15 PROCEDURE — C1769 GUIDE WIRE: HCPCS | Performed by: ORTHOPAEDIC SURGERY

## 2025-05-15 PROCEDURE — 7100000002 HC RECOVERY ROOM TIME - EACH INCREMENTAL 1 MINUTE: Performed by: ORTHOPAEDIC SURGERY

## 2025-05-15 PROCEDURE — A27822 PR OPEN TX TRIMALLEOLAR ANKLE FX W/O FIX PST LIP: Performed by: ANESTHESIOLOGY

## 2025-05-15 PROCEDURE — C1713 ANCHOR/SCREW BN/BN,TIS/BN: HCPCS | Performed by: ORTHOPAEDIC SURGERY

## 2025-05-15 DEVICE — IMPLANTABLE DEVICE: Type: IMPLANTABLE DEVICE | Site: ANKLE | Status: FUNCTIONAL

## 2025-05-15 DEVICE — SCREW, CANCELLOUS, FULL THREAD, 4 X 16 MM, STAINLESS STEEL: Type: IMPLANTABLE DEVICE | Site: ANKLE | Status: FUNCTIONAL

## 2025-05-15 DEVICE — SCREW, CANCELLOUS, FULL THREAD, 4 X 14 MM, STAINLESS STEEL: Type: IMPLANTABLE DEVICE | Site: ANKLE | Status: FUNCTIONAL

## 2025-05-15 DEVICE — SCREW, CANCELLOUS, FULL THREAD, 4 X 18 MM, STAINLESS STEEL: Type: IMPLANTABLE DEVICE | Site: ANKLE | Status: FUNCTIONAL

## 2025-05-15 RX ORDER — IPRATROPIUM BROMIDE 0.5 MG/2.5ML
500 SOLUTION RESPIRATORY (INHALATION) ONCE
Status: DISCONTINUED | OUTPATIENT
Start: 2025-05-15 | End: 2025-05-15 | Stop reason: HOSPADM

## 2025-05-15 RX ORDER — ONDANSETRON HYDROCHLORIDE 2 MG/ML
4 INJECTION, SOLUTION INTRAVENOUS ONCE AS NEEDED
Status: DISCONTINUED | OUTPATIENT
Start: 2025-05-15 | End: 2025-05-15 | Stop reason: HOSPADM

## 2025-05-15 RX ORDER — FENTANYL CITRATE 50 UG/ML
INJECTION, SOLUTION INTRAMUSCULAR; INTRAVENOUS AS NEEDED
Status: DISCONTINUED | OUTPATIENT
Start: 2025-05-15 | End: 2025-05-15

## 2025-05-15 RX ORDER — ONDANSETRON HYDROCHLORIDE 2 MG/ML
INJECTION, SOLUTION INTRAVENOUS AS NEEDED
Status: DISCONTINUED | OUTPATIENT
Start: 2025-05-15 | End: 2025-05-15

## 2025-05-15 RX ORDER — LIDOCAINE HYDROCHLORIDE 10 MG/ML
0.1 INJECTION, SOLUTION INFILTRATION; PERINEURAL ONCE
Status: DISCONTINUED | OUTPATIENT
Start: 2025-05-15 | End: 2025-05-15 | Stop reason: HOSPADM

## 2025-05-15 RX ORDER — CEFAZOLIN SODIUM 2 G/50ML
2 SOLUTION INTRAVENOUS ONCE
Status: CANCELLED | OUTPATIENT
Start: 2025-05-15

## 2025-05-15 RX ORDER — PROCHLORPERAZINE EDISYLATE 5 MG/ML
5 INJECTION INTRAMUSCULAR; INTRAVENOUS ONCE AS NEEDED
Status: DISCONTINUED | OUTPATIENT
Start: 2025-05-15 | End: 2025-05-15 | Stop reason: HOSPADM

## 2025-05-15 RX ORDER — ALBUTEROL SULFATE 0.83 MG/ML
2.5 SOLUTION RESPIRATORY (INHALATION) ONCE AS NEEDED
Status: DISCONTINUED | OUTPATIENT
Start: 2025-05-15 | End: 2025-05-15 | Stop reason: HOSPADM

## 2025-05-15 RX ORDER — SODIUM CHLORIDE, SODIUM LACTATE, POTASSIUM CHLORIDE, CALCIUM CHLORIDE 600; 310; 30; 20 MG/100ML; MG/100ML; MG/100ML; MG/100ML
100 INJECTION, SOLUTION INTRAVENOUS CONTINUOUS
Status: ACTIVE | OUTPATIENT
Start: 2025-05-15 | End: 2025-05-15

## 2025-05-15 RX ORDER — ASPIRIN 325 MG
325 TABLET ORAL 2 TIMES DAILY
Qty: 28 TABLET | Refills: 0 | Status: SHIPPED | OUTPATIENT
Start: 2025-05-15 | End: 2025-05-29

## 2025-05-15 RX ORDER — PROPOFOL 10 MG/ML
INJECTION, EMULSION INTRAVENOUS AS NEEDED
Status: DISCONTINUED | OUTPATIENT
Start: 2025-05-15 | End: 2025-05-15

## 2025-05-15 RX ORDER — CEFAZOLIN 1 G/1
INJECTION, POWDER, FOR SOLUTION INTRAVENOUS AS NEEDED
Status: DISCONTINUED | OUTPATIENT
Start: 2025-05-15 | End: 2025-05-15

## 2025-05-15 RX ORDER — SODIUM CHLORIDE, SODIUM LACTATE, POTASSIUM CHLORIDE, CALCIUM CHLORIDE 600; 310; 30; 20 MG/100ML; MG/100ML; MG/100ML; MG/100ML
INJECTION, SOLUTION INTRAVENOUS CONTINUOUS PRN
Status: DISCONTINUED | OUTPATIENT
Start: 2025-05-15 | End: 2025-05-15

## 2025-05-15 RX ORDER — ACETAMINOPHEN 325 MG/1
650 TABLET ORAL EVERY 4 HOURS PRN
Status: DISCONTINUED | OUTPATIENT
Start: 2025-05-15 | End: 2025-05-15 | Stop reason: HOSPADM

## 2025-05-15 RX ORDER — LIDOCAINE HYDROCHLORIDE 20 MG/ML
INJECTION, SOLUTION EPIDURAL; INFILTRATION; INTRACAUDAL; PERINEURAL AS NEEDED
Status: DISCONTINUED | OUTPATIENT
Start: 2025-05-15 | End: 2025-05-15

## 2025-05-15 RX ORDER — KETOROLAC TROMETHAMINE 30 MG/ML
15 INJECTION, SOLUTION INTRAMUSCULAR; INTRAVENOUS ONCE AS NEEDED
Status: DISCONTINUED | OUTPATIENT
Start: 2025-05-15 | End: 2025-05-15 | Stop reason: HOSPADM

## 2025-05-15 RX ORDER — GLYCOPYRROLATE 0.2 MG/ML
INJECTION INTRAMUSCULAR; INTRAVENOUS AS NEEDED
Status: DISCONTINUED | OUTPATIENT
Start: 2025-05-15 | End: 2025-05-15

## 2025-05-15 RX ORDER — ONDANSETRON HYDROCHLORIDE 2 MG/ML
4 INJECTION, SOLUTION INTRAVENOUS ONCE AS NEEDED
Status: COMPLETED | OUTPATIENT
Start: 2025-05-15 | End: 2025-05-15

## 2025-05-15 RX ADMIN — DEXAMETHASONE SODIUM PHOSPHATE 4 MG: 4 INJECTION, SOLUTION INTRAMUSCULAR; INTRAVENOUS at 13:38

## 2025-05-15 RX ADMIN — HYDROMORPHONE HYDROCHLORIDE 0.5 MG: 1 INJECTION, SOLUTION INTRAMUSCULAR; INTRAVENOUS; SUBCUTANEOUS at 15:04

## 2025-05-15 RX ADMIN — ONDANSETRON 4 MG: 2 INJECTION, SOLUTION INTRAMUSCULAR; INTRAVENOUS at 13:38

## 2025-05-15 RX ADMIN — CEFAZOLIN 2 G: 1 INJECTION, POWDER, FOR SOLUTION INTRAMUSCULAR; INTRAVENOUS at 13:40

## 2025-05-15 RX ADMIN — PROPOFOL 150 MG: 10 INJECTION, EMULSION INTRAVENOUS at 13:38

## 2025-05-15 RX ADMIN — LIDOCAINE HYDROCHLORIDE 50 MG: 20 INJECTION, SOLUTION EPIDURAL; INFILTRATION; INTRACAUDAL; PERINEURAL at 13:38

## 2025-05-15 RX ADMIN — ACETAMINOPHEN 650 MG: 325 TABLET ORAL at 15:26

## 2025-05-15 RX ADMIN — HYDROMORPHONE HYDROCHLORIDE 0.5 MG: 1 INJECTION, SOLUTION INTRAMUSCULAR; INTRAVENOUS; SUBCUTANEOUS at 14:54

## 2025-05-15 RX ADMIN — SODIUM CHLORIDE, POTASSIUM CHLORIDE, SODIUM LACTATE AND CALCIUM CHLORIDE: 600; 310; 30; 20 INJECTION, SOLUTION INTRAVENOUS at 13:25

## 2025-05-15 RX ADMIN — ONDANSETRON 4 MG: 2 INJECTION INTRAMUSCULAR; INTRAVENOUS at 15:37

## 2025-05-15 RX ADMIN — FENTANYL CITRATE 50 MCG: 50 INJECTION, SOLUTION INTRAMUSCULAR; INTRAVENOUS at 14:40

## 2025-05-15 RX ADMIN — FENTANYL CITRATE 50 MCG: 50 INJECTION, SOLUTION INTRAMUSCULAR; INTRAVENOUS at 13:50

## 2025-05-15 RX ADMIN — GLYCOPYRROLATE 0.2 MG: 0.2 INJECTION, SOLUTION INTRAMUSCULAR; INTRAVENOUS at 13:38

## 2025-05-15 RX ADMIN — PROPOFOL 50 MG: 10 INJECTION, EMULSION INTRAVENOUS at 13:50

## 2025-05-15 SDOH — HEALTH STABILITY: MENTAL HEALTH: CURRENT SMOKER: 0

## 2025-05-15 ASSESSMENT — PAIN SCALES - GENERAL
PAINLEVEL_OUTOF10: 7
PAINLEVEL_OUTOF10: 0 - NO PAIN
PAINLEVEL_OUTOF10: 9
PAINLEVEL_OUTOF10: 9
PAINLEVEL_OUTOF10: 0 - NO PAIN
PAINLEVEL_OUTOF10: 4
PAINLEVEL_OUTOF10: 7

## 2025-05-15 ASSESSMENT — PAIN - FUNCTIONAL ASSESSMENT
PAIN_FUNCTIONAL_ASSESSMENT: 0-10
PAIN_FUNCTIONAL_ASSESSMENT: VAS (VISUAL ANALOG SCALE)
PAIN_FUNCTIONAL_ASSESSMENT: 0-10
PAIN_FUNCTIONAL_ASSESSMENT: VAS (VISUAL ANALOG SCALE)
PAIN_FUNCTIONAL_ASSESSMENT: 0-10
PAIN_FUNCTIONAL_ASSESSMENT: 0-10
PAIN_FUNCTIONAL_ASSESSMENT: VAS (VISUAL ANALOG SCALE)
PAIN_FUNCTIONAL_ASSESSMENT: 0-10
PAIN_FUNCTIONAL_ASSESSMENT: 0-10

## 2025-05-15 NOTE — ANESTHESIA POSTPROCEDURE EVALUATION
Patient: Trinh Vences    Procedure Summary       Date: 05/15/25 Room / Location: [facility] OR 06 / Virtual [facility] OR    Anesthesia Start: 1318 Anesthesia Stop: 1450    Procedure: LEFT ANKLE TRIMALLEOLAR OPEN REDUCTION INTERNAL FIXATION WITH C-ARM (Left: Ankle) Diagnosis:       Closed trimalleolar fracture of left ankle, initial encounter      (LEFT ANKLE TRIMALLEOLAR FRACTURE)    Surgeons: Michael Molina MD Responsible Provider: Nicholas Javed MD    Anesthesia Type: general ASA Status: 3            Anesthesia Type: general    Vitals Value Taken Time   /77 05/15/25 15:00   Temp 36.3 °C (97.3 °F) 05/15/25 14:43   Pulse 81 05/15/25 15:08   Resp 16 05/15/25 14:43   SpO2 97 % 05/15/25 15:08   Vitals shown include unfiled device data.    Anesthesia Post Evaluation    Patient location during evaluation: PACU  Patient participation: complete - patient participated  Level of consciousness: awake and alert  Pain management: adequate  Airway patency: patent  Cardiovascular status: acceptable  Respiratory status: acceptable  Hydration status: acceptable  Postoperative Nausea and Vomiting: none        There were no known notable events for this encounter.

## 2025-05-15 NOTE — DISCHARGE SUMMARY
Discharge Diagnosis  LEFT ANKLE FX    Issues Requiring Follow-Up      Test Results Pending At Discharge  Pending Labs       No current pending labs.            Hospital Course       Pertinent Physical Exam At Time of Discharge  Physical Exam    Home Medications     Medication List      START taking these medications     aspirin 325 mg tablet; Take 1 tablet (325 mg) by mouth 2 times a day for   14 days.; Replaces: aspirin 81 mg chewable tablet     CONTINUE taking these medications     acetaminophen 325 mg tablet; Commonly known as: Tylenol; Take 2 tablets   (650 mg) by mouth 2 times a day.   alendronate 70 mg tablet; Commonly known as: Fosamax; Take 1 tablet (70   mg) by mouth every 7 days. Take in the morning with a full glass of water,   on an empty stomach, and do not take anything else by mouth or lie down   for the next 30 min.   calcium carbonate 500 mg (200 mg elemental) chewable tablet; Commonly   known as: Tums   cholecalciferol (vitamin D3) 250 mcg (10,000 unit) capsule   cyanocobalamin 1,000 mcg tablet; Commonly known as: Vitamin B-12   FISH OIL ORAL   MAGNESIUM GLYCINATE ORAL   omeprazole 20 mg DR capsule; Commonly known as: PriLOSEC; TAKE 1 CAPSULE   BY MOUTH ONCE  DAILY IN THE MORNING BEFORE A  MEAL   oxyCODONE 5 mg immediate release tablet; Commonly known as: Roxicodone;   Take 1 tablet (5 mg) by mouth every 6 hours if needed for severe pain (7 -   10).   traZODone 50 mg tablet; Commonly known as: Desyrel; TAKE 1 TABLET BY   MOUTH ONCE  DAILY AT BEDTIME     STOP taking these medications     aspirin 81 mg chewable tablet; Replaced by: aspirin 325 mg tablet   estradiol 0.01 % (0.1 mg/gram) vaginal cream; Commonly known as: Estrace   Fish OiL 1,000 (120-180) mg capsule; Generic drug: omega 3-dha-epa-fish   oil   melatonin 3 mg tablet   Myrbetriq 25 mg tablet extended release 24 hr; Generic drug: mirabegron   polyethylene glycol 17 gram packet; Commonly known as: Glycolax, Miralax       Outpatient  Follow-Up  Future Appointments   Date Time Provider Department Center   6/9/2025  1:30 PM Gage Angela MD [id number] [place]       Michael Molina MD

## 2025-05-15 NOTE — OP NOTE
"PRE OP DIAGNOSIS - FX RT ANKLE    POST OP DIAGNOSIS- TRIMAL RT ANKLE FX    PROCEDURE - ORIF RT ANKLE FX    SURGEON- RADHA ALEJO MD    ASSIST-   ED G    BLOOD LOSS  NONE    FINDINGS- FX     TOURNIQUET- 17\"    DRAIN- NONE    PT WAS TAKEN TO THE OPERATING ROOM AND PLACED SUPINE.  HE WAS PREPPED AND DRAPED IN A STERILE MANNER.  TOURNIQUET WAS INFLATED  MM HG.  LATEAL LONGITUDINAL INCISION WAS MADE OVER THE FX SITE THROUGH SKIN AND SUBCUTANEOUS TISSE.  THE FX WAS EXPOSED AND REDUCED.   IT WAS FIXED WITH A 6 HOLE SEMITUBULAR PLATE WITH 6 SCREWS WITH GOOD PURCHASE AND FIXATION.      A SMALL MEDIAL INCISION WAS MADE TO FIX THE MED MAL FX .  I ATTEMPTED TO PLACE A GUIDE PIN BUT THE FX FRAGMENT WAS TOO SMALL AND I ELECTED NOT TO FIX THAT FRAGMENT.    WOUND WAS IRRIGATED.  AND CLOSED WITH A 2-0 VICRYL SUBCUTANEOUS STITCH AND 3-0 NYLON VERTICAL MATTRESS FOR THE SKIN.  A DRY STERILE DRESSING WAS APPLIED.  HE WAS PLACED IN A POSTERIOR SPLINT AND RETURNED TO THE RECOVERY ROOM IN SATISFACTORY CONDITION.    THE EXCELLENT ASSISTANCE OF CERTIFIED PA WAS NECESSARY FOR SUCCESSFUL OUTCOME OF THIS SURGERY INCLUDING PREP, EXPOSURE, RETRACTING AND CLOSURE    THIS WAS DICTATED BY RADHA ALEJO MD    "

## 2025-05-15 NOTE — ANESTHESIA PROCEDURE NOTES
Airway  Date/Time: 5/15/2025 1:38 PM  Reason: elective    Difficult airway    Staffing  Performed: CRNA   Authorized by: Nicholas Javed MD    Performed by: JUAN DAVID Ojeda-FANY, JADE  Patient location during procedure: OR    Patient Condition  Indications for airway management: anesthesia  Patient position: sniffing  MILS maintained throughout  Sedation level: deep     Final Airway Details   Preoxygenated: yes  Final airway type: supraglottic airway  Successful airway: Supraglottic airway: I-Gel.  Size: 4   Ventilation between attempts: none  Number of attempts at approach: 1  Number of other approaches attempted: 0

## 2025-05-20 DIAGNOSIS — Z12.31 SCREENING MAMMOGRAM, ENCOUNTER FOR: ICD-10-CM

## 2025-05-23 ENCOUNTER — PATIENT OUTREACH (OUTPATIENT)
Dept: PRIMARY CARE | Facility: CLINIC | Age: 80
End: 2025-05-23
Payer: MEDICARE

## 2025-05-28 ENCOUNTER — TELEPHONE (OUTPATIENT)
Dept: PRIMARY CARE | Facility: CLINIC | Age: 80
End: 2025-05-28
Payer: MEDICARE

## 2025-06-06 ENCOUNTER — TELEPHONE (OUTPATIENT)
Dept: PRIMARY CARE | Facility: CLINIC | Age: 80
End: 2025-06-06
Payer: MEDICARE

## 2025-06-09 ENCOUNTER — APPOINTMENT (OUTPATIENT)
Dept: PRIMARY CARE | Facility: CLINIC | Age: 80
End: 2025-06-09
Payer: MEDICARE

## 2025-06-09 VITALS
WEIGHT: 157.2 LBS | SYSTOLIC BLOOD PRESSURE: 126 MMHG | HEIGHT: 66 IN | HEART RATE: 83 BPM | DIASTOLIC BLOOD PRESSURE: 73 MMHG | BODY MASS INDEX: 25.26 KG/M2 | TEMPERATURE: 97.5 F | OXYGEN SATURATION: 94 %

## 2025-06-09 DIAGNOSIS — Z91.81 HISTORY OF RECENT FALL: ICD-10-CM

## 2025-06-09 DIAGNOSIS — Z13.6 ENCOUNTER FOR LIPID SCREENING FOR CARDIOVASCULAR DISEASE: ICD-10-CM

## 2025-06-09 DIAGNOSIS — F33.1 MODERATE EPISODE OF RECURRENT MAJOR DEPRESSIVE DISORDER: ICD-10-CM

## 2025-06-09 DIAGNOSIS — D24.2 INTRADUCTAL PAPILLOMA OF LEFT BREAST: ICD-10-CM

## 2025-06-09 DIAGNOSIS — Z13.220 SCREENING, LIPID: ICD-10-CM

## 2025-06-09 DIAGNOSIS — Z13.220 ENCOUNTER FOR LIPID SCREENING FOR CARDIOVASCULAR DISEASE: ICD-10-CM

## 2025-06-09 DIAGNOSIS — Z00.00 MEDICARE ANNUAL WELLNESS VISIT, SUBSEQUENT: Primary | ICD-10-CM

## 2025-06-09 DIAGNOSIS — Z13.31 DEPRESSION SCREEN: ICD-10-CM

## 2025-06-09 DIAGNOSIS — R73.9 ELEVATED BLOOD SUGAR LEVEL: ICD-10-CM

## 2025-06-09 DIAGNOSIS — C18.1 CANCER OF APPENDIX (MULTI): ICD-10-CM

## 2025-06-09 DIAGNOSIS — M81.0 AGE-RELATED OSTEOPOROSIS WITHOUT CURRENT PATHOLOGICAL FRACTURE: ICD-10-CM

## 2025-06-09 DIAGNOSIS — Z00.00 ANNUAL PHYSICAL EXAM: ICD-10-CM

## 2025-06-09 DIAGNOSIS — Z71.89 ADVANCE DIRECTIVE DISCUSSED WITH PATIENT: ICD-10-CM

## 2025-06-09 DIAGNOSIS — Z13.39 ALCOHOL SCREENING: ICD-10-CM

## 2025-06-09 DIAGNOSIS — Z98.890 HISTORY OF ANKLE SURGERY: ICD-10-CM

## 2025-06-09 DIAGNOSIS — S82.892D CLOSED FRACTURE OF LEFT ANKLE WITH ROUTINE HEALING, SUBSEQUENT ENCOUNTER: ICD-10-CM

## 2025-06-09 PROBLEM — E13.40 NEUROPATHY DUE TO SECONDARY DIABETES: Status: RESOLVED | Noted: 2024-06-12 | Resolved: 2025-06-09

## 2025-06-09 PROCEDURE — 99213 OFFICE O/P EST LOW 20 MIN: CPT | Performed by: INTERNAL MEDICINE

## 2025-06-09 PROCEDURE — 1159F MED LIST DOCD IN RCRD: CPT | Performed by: INTERNAL MEDICINE

## 2025-06-09 PROCEDURE — 1126F AMNT PAIN NOTED NONE PRSNT: CPT | Performed by: INTERNAL MEDICINE

## 2025-06-09 PROCEDURE — 99497 ADVNCD CARE PLAN 30 MIN: CPT | Performed by: INTERNAL MEDICINE

## 2025-06-09 PROCEDURE — G0439 PPPS, SUBSEQ VISIT: HCPCS | Performed by: INTERNAL MEDICINE

## 2025-06-09 PROCEDURE — G0442 ANNUAL ALCOHOL SCREEN 15 MIN: HCPCS | Performed by: INTERNAL MEDICINE

## 2025-06-09 PROCEDURE — 1036F TOBACCO NON-USER: CPT | Performed by: INTERNAL MEDICINE

## 2025-06-09 PROCEDURE — 99397 PER PM REEVAL EST PAT 65+ YR: CPT | Performed by: INTERNAL MEDICINE

## 2025-06-09 PROCEDURE — G2211 COMPLEX E/M VISIT ADD ON: HCPCS | Performed by: INTERNAL MEDICINE

## 2025-06-09 PROCEDURE — 1170F FXNL STATUS ASSESSED: CPT | Performed by: INTERNAL MEDICINE

## 2025-06-09 PROCEDURE — G0444 DEPRESSION SCREEN ANNUAL: HCPCS | Performed by: INTERNAL MEDICINE

## 2025-06-09 ASSESSMENT — ACTIVITIES OF DAILY LIVING (ADL)
DRESSING: INDEPENDENT
DOING_HOUSEWORK: NEEDS ASSISTANCE
BATHING: INDEPENDENT
TAKING_MEDICATION: INDEPENDENT
GROCERY_SHOPPING: INDEPENDENT

## 2025-06-09 ASSESSMENT — ENCOUNTER SYMPTOMS
LOSS OF SENSATION IN FEET: 1
OCCASIONAL FEELINGS OF UNSTEADINESS: 0
DEPRESSION: 0

## 2025-06-09 ASSESSMENT — PAIN SCALES - GENERAL: PAINLEVEL_OUTOF10: 0-NO PAIN

## 2025-06-09 ASSESSMENT — PATIENT HEALTH QUESTIONNAIRE - PHQ9
1. LITTLE INTEREST OR PLEASURE IN DOING THINGS: NOT AT ALL
2. FEELING DOWN, DEPRESSED OR HOPELESS: NOT AT ALL
SUM OF ALL RESPONSES TO PHQ9 QUESTIONS 1 AND 2: 0

## 2025-06-09 NOTE — PATIENT INSTRUCTIONS
Plan  Available hospital discharge summary , pertinent lab and radiological results were reviewed and discussed with patient . Questions related to it answered to patient's satisfaction.  Medicare wellness done.  Annual physical done. Tests ordered  Current medications are effective. advised to continue current medications.  F/U with orhto as advised  Blood tests ordered  F/U 6 months and in 12 months for medicare wellness.

## 2025-06-09 NOTE — PROGRESS NOTES
My nurse note reviewed. Patient is here for:  Medicare Annual Wellness Visit Subsequent       Here with .  Pt is here for medicare wellness, physical and follow up    Few weeks ago fell on steps and injured L ankle with fx of lower part of fibula, underwent surgery with plate and screws, will follow with ortho. Having home therapy currently . She is not supposed to put pressure on L leg as per her. Has cast on L leg    Patient denies any shortness of breath, PND, orthopnea, chest pain , palpitation, syncope or edema in legs  patient denies any abdominal pain, tenderness, nausea, vomiting, change in bowel habits or blood in stool.  Patient denies any weakness in extremities.. Denies any headache, visual symptoms , speech problems or  tremors . No TIA or stroke like symptoms..  Depression symptoms ok on 1/2 pill of trazodone.    Over the past 2 weeks, how often have you been bothered by any of the following problems?  Little interest or pleasure in doing things: Not at all  Feeling down, depressed, or hopeless: Not at all  Functional Ability/Level of Safety  Cognitive Impairment Observed: No cognitive impairment observed  Cognitive Impairment Reported: No cognitive impairment reported by patient or family  Nutrition and Exercise  Current Diet: Well Balanced Diet  Adequate Fluid Intake: Yes  Caffeine: Yes  Exercise Frequency: Infrequently (Due to left foot)  IADL's  Grocery Shopping: Independent (Due to left foot)  Doing Housework: Needs assistance (Due to foot)  Taking Medication: Independent  Falls Home Safety Risk Factors  Home Safety Risk Factors: No grab bars, bathroom       During Medicare wellness apart from looking at assessment done by nurse,  I also asked following :    Alcohol use : Alcohol screening  was  done during this visit. Patient is not having any issue with increase alcohol use . No ETOH abuse was observed by history . No drinking at all.    No smoking     Depression screen:  > 5 minutes   "were spent screening for depression using PHQ2/PHQ9 as documented in the chart.    HIV test: patient was not found to be high risk for HIV     Cognitive issue : None     Advanced Directive: Patient has advanced directive including living will and Health care power of . Health [facility] is  [place].  . All questions related to it answered. Total time > 16 min.     I have reviewed all active medications patient is currently on . Questions related to medication answered to patient's satisfaction.    REVIEW OF SYSTEMS:   All other systems have been reviewed and are negative in relation to patient's complaint and other than what is mentioned in History of present illness.      OBJECTIVE :    /73   Pulse 83   Temp 36.4 °C (97.5 °F)   Ht 1.676 m (5' 6\")   Wt 71.3 kg (157 lb 3.2 oz)   SpO2 94%   BMI 25.37 kg/m²   Vitals noted  Not in acute distress  Conj Pink, No icterus  ears exam normal  Neck:No Cervical LN enlargement, No Thyroid enlargement No carotid bruit  Lungs: good air entry bilaterally, no rales or rhonchi  sees Dr aDy for breast issue  CVS: S1 S2 + , no S3. No loud heart murmur heard.   Abdomen: Soft, non tender , BS +. no organomegaly , no CVA tenderness  MSK: No spine tenderness or muscle tenderness noted on gross examination  CNS: Pt is alert, moving all 4 extremities. no motor weakness or abnormal movements noted on gross examination.  Extremities: No edema, No calf tenderness, Kenji's sign negative. DTR + knee and wrists, has cast on L leg.     1. Medicare annual wellness visit, subsequent  Done.       2. Annual physical exam  Done. Tests ordered      3. Alcohol screening        4. Depression screen        5. Advance directive discussed with patient        6. Moderate episode of recurrent major depressive disorder  Hx of. On med. Doing well      7. Cancer of appendix (Multi)  Hx of. No active issue .       8. Intraductal papilloma of left breast  Sees Dr El, will have mammogram for " it      9. Closed fracture of left ankle with routine healing, subsequent encounter  Had surgery . Sees ortho. [facility] currently . Having PT /OT      10. History of ankle surgery        11. History of recent fall        12. Elevated blood sugar level  Lipid Panel Non-Fasting    Lipid Panel Non-Fasting      13. Screening, lipid        14. Encounter for lipid screening for cardiovascular disease  Lipid Panel Non-Fasting    Lipid Panel Non-Fasting      During office visit today patient's chronic diagnosis were reviewed and questions related to it answered to patient's satisfaction. I plan to follow up on patient's chronic medical conditions as appropriate for continuity of care .     Plan  Available hospital discharge summary , pertinent lab and radiological results were reviewed and discussed with patient . Questions related to it answered to patient's satisfaction.  Medicare wellness done.  Annual physical done. Tests ordered  Current medications are effective. advised to continue current medications.  F/U with orhto as advised  Blood tests ordered  F/U 6 months and in 12 months for medicare wellness.

## 2025-06-10 LAB
CHOLEST SERPL-MCNC: 184 MG/DL
CHOLEST/HDLC SERPL: 2.9 (CALC)
HDLC SERPL-MCNC: 63 MG/DL
LDLC SERPL CALC-MCNC: 95 MG/DL (CALC)
NONHDLC SERPL-MCNC: 121 MG/DL (CALC)
TRIGL SERPL-MCNC: 165 MG/DL

## 2025-06-26 ENCOUNTER — PATIENT OUTREACH (OUTPATIENT)
Dept: PRIMARY CARE | Facility: CLINIC | Age: 80
End: 2025-06-26
Payer: MEDICARE

## 2025-06-26 NOTE — HPI: FULL BODY SKIN EXAMINATION
How Severe Are Your Spot(S)?: moderate
What Is The Reason For Today's Visit?: Full Body Skin Examination
What Is The Reason For Today's Visit? (Being Monitored For X): the development of a new lesion
105

## 2025-06-27 ENCOUNTER — TELEPHONE (OUTPATIENT)
Dept: PRIMARY CARE | Facility: CLINIC | Age: 80
End: 2025-06-27
Payer: MEDICARE

## 2025-12-08 ENCOUNTER — APPOINTMENT (OUTPATIENT)
Dept: PRIMARY CARE | Facility: CLINIC | Age: 80
End: 2025-12-08
Payer: MEDICARE

## (undated) DEVICE — DRAPE COVER, C ARM, FLOUROSCAN IMAGING SYS

## (undated) DEVICE — DRESSING, GAUZE, SPONGE, 12 PLY, CURITY, 4 X 4 IN, RIGID TRAY, STERILE

## (undated) DEVICE — DRAPE, SHEET, THREE QUARTER, FAN FOLD, 57 X 77 IN

## (undated) DEVICE — GUIDEWIRE, 1.6 X 150 THREADED

## (undated) DEVICE — CUFF, TOURNIQUET, 30 X 4, DUAL PORT/SNGL BLADDER, DISP, LF

## (undated) DEVICE — DRAPE, U-DRAPE, NON STERILE

## (undated) DEVICE — BANDAGE, ELASTIC, PREMIUM, SELF-CLOSE, 6 IN X 5.5 YD, STERILE

## (undated) DEVICE — Device

## (undated) DEVICE — BAG, BANDED, 36IN X 28IN

## (undated) DEVICE — DRAPE, SHEET, U, W/ADHESIVE STRIP, IMPERVIOUS, 60 X 70 IN, DISPOSABLE, LF, STERILE